# Patient Record
Sex: MALE | Race: WHITE | NOT HISPANIC OR LATINO
[De-identification: names, ages, dates, MRNs, and addresses within clinical notes are randomized per-mention and may not be internally consistent; named-entity substitution may affect disease eponyms.]

---

## 2020-07-08 ENCOUNTER — TRANSCRIPTION ENCOUNTER (OUTPATIENT)
Age: 40
End: 2020-07-08

## 2020-07-08 ENCOUNTER — INPATIENT (INPATIENT)
Facility: HOSPITAL | Age: 40
LOS: 1 days | Discharge: HOME | DRG: 518 | End: 2020-07-10
Attending: ORTHOPAEDIC SURGERY | Admitting: ORTHOPAEDIC SURGERY
Payer: MEDICAID

## 2020-07-08 VITALS
TEMPERATURE: 98 F | OXYGEN SATURATION: 95 % | RESPIRATION RATE: 18 BRPM | HEART RATE: 74 BPM | DIASTOLIC BLOOD PRESSURE: 77 MMHG | SYSTOLIC BLOOD PRESSURE: 121 MMHG | WEIGHT: 210.1 LBS

## 2020-07-08 DIAGNOSIS — M54.2 CERVICALGIA: ICD-10-CM

## 2020-07-08 DIAGNOSIS — Z98.890 OTHER SPECIFIED POSTPROCEDURAL STATES: Chronic | ICD-10-CM

## 2020-07-08 LAB
ALBUMIN SERPL ELPH-MCNC: 4.1 G/DL — SIGNIFICANT CHANGE UP (ref 3.3–5)
ALP SERPL-CCNC: 99 U/L — SIGNIFICANT CHANGE UP (ref 40–120)
ALT FLD-CCNC: 33 U/L — SIGNIFICANT CHANGE UP (ref 12–78)
ANION GAP SERPL CALC-SCNC: 7 MMOL/L — SIGNIFICANT CHANGE UP (ref 5–17)
APTT BLD: 35.6 SEC — HIGH (ref 27.5–35.5)
AST SERPL-CCNC: 31 U/L — SIGNIFICANT CHANGE UP (ref 15–37)
BASOPHILS # BLD AUTO: 0.02 K/UL — SIGNIFICANT CHANGE UP (ref 0–0.2)
BASOPHILS NFR BLD AUTO: 0.6 % — SIGNIFICANT CHANGE UP (ref 0–2)
BILIRUB SERPL-MCNC: 0.3 MG/DL — SIGNIFICANT CHANGE UP (ref 0.2–1.2)
BUN SERPL-MCNC: 16 MG/DL — SIGNIFICANT CHANGE UP (ref 7–23)
CALCIUM SERPL-MCNC: 8.7 MG/DL — SIGNIFICANT CHANGE UP (ref 8.5–10.1)
CHLORIDE SERPL-SCNC: 107 MMOL/L — SIGNIFICANT CHANGE UP (ref 96–108)
CO2 SERPL-SCNC: 26 MMOL/L — SIGNIFICANT CHANGE UP (ref 22–31)
CREAT SERPL-MCNC: 0.87 MG/DL — SIGNIFICANT CHANGE UP (ref 0.5–1.3)
EOSINOPHIL # BLD AUTO: 0.06 K/UL — SIGNIFICANT CHANGE UP (ref 0–0.5)
EOSINOPHIL NFR BLD AUTO: 1.8 % — SIGNIFICANT CHANGE UP (ref 0–6)
GLUCOSE SERPL-MCNC: 119 MG/DL — HIGH (ref 70–99)
HCT VFR BLD CALC: 38.7 % — LOW (ref 39–50)
HGB BLD-MCNC: 12.9 G/DL — LOW (ref 13–17)
IMM GRANULOCYTES NFR BLD AUTO: 0.3 % — SIGNIFICANT CHANGE UP (ref 0–1.5)
INR BLD: 1.05 RATIO — SIGNIFICANT CHANGE UP (ref 0.88–1.16)
LYMPHOCYTES # BLD AUTO: 1.36 K/UL — SIGNIFICANT CHANGE UP (ref 1–3.3)
LYMPHOCYTES # BLD AUTO: 40.6 % — SIGNIFICANT CHANGE UP (ref 13–44)
MCHC RBC-ENTMCNC: 26.2 PG — LOW (ref 27–34)
MCHC RBC-ENTMCNC: 33.3 GM/DL — SIGNIFICANT CHANGE UP (ref 32–36)
MCV RBC AUTO: 78.7 FL — LOW (ref 80–100)
MONOCYTES # BLD AUTO: 0.31 K/UL — SIGNIFICANT CHANGE UP (ref 0–0.9)
MONOCYTES NFR BLD AUTO: 9.3 % — SIGNIFICANT CHANGE UP (ref 2–14)
NEUTROPHILS # BLD AUTO: 1.59 K/UL — LOW (ref 1.8–7.4)
NEUTROPHILS NFR BLD AUTO: 47.4 % — SIGNIFICANT CHANGE UP (ref 43–77)
NRBC # BLD: 0 /100 WBCS — SIGNIFICANT CHANGE UP (ref 0–0)
PLATELET # BLD AUTO: 154 K/UL — SIGNIFICANT CHANGE UP (ref 150–400)
POTASSIUM SERPL-MCNC: 4 MMOL/L — SIGNIFICANT CHANGE UP (ref 3.5–5.3)
POTASSIUM SERPL-SCNC: 4 MMOL/L — SIGNIFICANT CHANGE UP (ref 3.5–5.3)
PROT SERPL-MCNC: 8 G/DL — SIGNIFICANT CHANGE UP (ref 6–8.3)
PROTHROM AB SERPL-ACNC: 12.3 SEC — SIGNIFICANT CHANGE UP (ref 10.6–13.6)
RBC # BLD: 4.92 M/UL — SIGNIFICANT CHANGE UP (ref 4.2–5.8)
RBC # FLD: 13.2 % — SIGNIFICANT CHANGE UP (ref 10.3–14.5)
SARS-COV-2 RNA SPEC QL NAA+PROBE: SIGNIFICANT CHANGE UP
SODIUM SERPL-SCNC: 140 MMOL/L — SIGNIFICANT CHANGE UP (ref 135–145)
WBC # BLD: 3.35 K/UL — LOW (ref 3.8–10.5)
WBC # FLD AUTO: 3.35 K/UL — LOW (ref 3.8–10.5)

## 2020-07-08 PROCEDURE — 72040 X-RAY EXAM NECK SPINE 2-3 VW: CPT | Mod: 26

## 2020-07-08 PROCEDURE — 71045 X-RAY EXAM CHEST 1 VIEW: CPT | Mod: 26

## 2020-07-08 PROCEDURE — 93010 ELECTROCARDIOGRAM REPORT: CPT

## 2020-07-08 PROCEDURE — 99285 EMERGENCY DEPT VISIT HI MDM: CPT

## 2020-07-08 RX ORDER — SODIUM CHLORIDE 9 MG/ML
1000 INJECTION, SOLUTION INTRAVENOUS
Refills: 0 | Status: DISCONTINUED | OUTPATIENT
Start: 2020-07-08 | End: 2020-07-09

## 2020-07-08 RX ORDER — LANOLIN ALCOHOL/MO/W.PET/CERES
3 CREAM (GRAM) TOPICAL AT BEDTIME
Refills: 0 | Status: DISCONTINUED | OUTPATIENT
Start: 2020-07-08 | End: 2020-07-09

## 2020-07-08 RX ADMIN — Medication 3 MILLIGRAM(S): at 23:30

## 2020-07-08 RX ADMIN — SODIUM CHLORIDE 100 MILLILITER(S): 9 INJECTION, SOLUTION INTRAVENOUS at 23:30

## 2020-07-08 NOTE — ED PROVIDER NOTE - CLINICAL SUMMARY MEDICAL DECISION MAKING FREE TEXT BOX
neck pain x 2 years, sent to ED by orthopedic Dr Vidales for surgery, will obtain labs, imaging, ortho consult , admission

## 2020-07-08 NOTE — CONSULT NOTE ADULT - ASSESSMENT
39M who presents for C5-6 total disc replacement tomorrow 7/9    -Pain control as needed  -WBAT/OOB with assistive devices as needed  -DVT ppx: SCDs, no chemical DVT ppx  -FU preop labs/workup  -Plan for total disc replacement with Dr. Vidales tomorrow 7/9  -FU medical clearance/optimization  -Medical management per primary team  -Will discuss with attending and will advise if plan changes 39M who presents for C5-6 total disc replacement tomorrow 7/9    -Pain control as needed  -WBAT/OOB with assistive devices as needed  -FU preop labs/workup  -Plan for total disc replacement with Dr. Vidales tomorrow 7/9  -NPO after midnight except medications  -IVF while NPO  -DVT ppx: SCDs, no chemical DVT ppx  -FU medical clearance/optimization  -Medical management per primary team  -Will discuss with attending and will advise if plan changes

## 2020-07-08 NOTE — CONSULT NOTE ADULT - ASSESSMENT
39y Male presents for spine surgery tomorrow 7/9. Pt is scheduled for total disc replacement with Dr. Vidales.     Plan: 39y Male presents for spine surgery tomorrow 7/9. Pt is scheduled for total disc replacement with Dr. Vidales.     Plan:    Patient is admitted for C5-6 total disc replacement tomorrow 7/9    -EKG 1st degree AV block, 62 bpm  - CXR reviewed  -CBC, CMP reviewed, f/u coags, type and screen  -Pain control as needed  -WBAT/OOB with assistive devices as needed  -NPO after midnight except medications  -IVF while NPO  -DVT ppx: SCDs, no chemical DVT ppx  - Patient is medically optimized at low risk risk for intermediate risk procedure with routine HD monitoring. 39y Male presents for spine surgery tomorrow 7/9. Pt is scheduled for total disc replacement with Dr. Vidales.     Plan:    Patient is admitted for C5-6 total disc replacement tomorrow 7/9    -EKG 1st degree AV block, 62 bpm  - CXR reviewed  -CBC, CMP reviewed, f/u coags, type and screen  -Pain control as needed  -WBAT/OOB with assistive devices as needed  -NPO after midnight except medications  -IVF while NPO  -DVT ppx: SCDs, no chemical DVT ppx  - No medical contraindications to proposed surgery
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
## 2020-07-08 NOTE — ED ADULT NURSE NOTE - NSIMPLEMENTINTERV_GEN_ALL_ED
Implemented All Universal Safety Interventions:  [place] to call system. Call bell, personal items and telephone within reach. Instruct patient to call for assistance. Room bathroom lighting operational. Non-slip footwear when patient is off stretcher. Physically safe environment: no spills, clutter or unnecessary equipment. Stretcher in lowest position, wheels locked, appropriate side rails in place.
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
## 2020-07-08 NOTE — ED PROVIDER NOTE - PROGRESS NOTE DETAILS
spoke with ortho resident, case discussed, requested xray cervical spine, will see patient spoke with ortho resident , admit to Dr Vidales, requested call hospitalist for medical clearance, surgery scheduled for tomorrow spoke with Dr Moe Dhillon, hospitalist, advised call Dr Miguel Dhillon spoke with Dr Miguel Dhillon, advised call Dr Daryl Perlman, call out to Dr Perlman, awaiting call back spoke with Dr Perlman, case discussed, will do medical clearance, advised call medicine resident  spoke with medicine resident, case discussed, will give to next medicine resident
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
## 2020-07-08 NOTE — H&P ADULT - ASSESSMENT
39M who presents for C5-6 total disc replacement tomorrow 7/9    -Pain control as needed  -WBAT/OOB with assistive devices as needed  -FU preop labs/workup  -Plan for total disc replacement with Dr. Vidales tomorrow 7/9  -NPO after midnight except medications  -IVF while NPO  -DVT ppx: SCDs, no chemical DVT ppx  -FU medical clearance/optimization  -Medical management per primary team  -Will discuss with attending and will advise if plan changes

## 2020-07-08 NOTE — ED ADULT NURSE NOTE - OBJECTIVE STATEMENT
Pt presents to the ED s/p left sided neck pain, occasionally causes numbness to the bilat elbows and right thumb.

## 2020-07-08 NOTE — ED PROVIDER NOTE - CHPI ED SYMPTOMS NEG
no tingling/no bruising/no numbness/no difficulty bearing weight/no fever/no back pain/no weakness/no deformity/no stiffness/no abrasion
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
## 2020-07-08 NOTE — ED PROVIDER NOTE - CPE EDP NEURO NORM
Pt complains of numbness/tingling on the left side of face. Rhinorrhea present. Post nasal drip. Complains of headache and back pain. Face symmetrical. No slurred speech. No nuchal rigidity, normal...

## 2020-07-09 ENCOUNTER — TRANSCRIPTION ENCOUNTER (OUTPATIENT)
Age: 40
End: 2020-07-09

## 2020-07-09 ENCOUNTER — RESULT REVIEW (OUTPATIENT)
Age: 40
End: 2020-07-09

## 2020-07-09 LAB
ANION GAP SERPL CALC-SCNC: 1 MMOL/L — LOW (ref 5–17)
ANION GAP SERPL CALC-SCNC: 5 MMOL/L — SIGNIFICANT CHANGE UP (ref 5–17)
APTT BLD: 33 SEC — SIGNIFICANT CHANGE UP (ref 27.5–35.5)
BASOPHILS # BLD AUTO: 0.01 K/UL — SIGNIFICANT CHANGE UP (ref 0–0.2)
BASOPHILS NFR BLD AUTO: 0.2 % — SIGNIFICANT CHANGE UP (ref 0–2)
BUN SERPL-MCNC: 13 MG/DL — SIGNIFICANT CHANGE UP (ref 7–23)
BUN SERPL-MCNC: 14 MG/DL — SIGNIFICANT CHANGE UP (ref 7–23)
CALCIUM SERPL-MCNC: 8.9 MG/DL — SIGNIFICANT CHANGE UP (ref 8.5–10.1)
CALCIUM SERPL-MCNC: 9 MG/DL — SIGNIFICANT CHANGE UP (ref 8.5–10.1)
CHLORIDE SERPL-SCNC: 107 MMOL/L — SIGNIFICANT CHANGE UP (ref 96–108)
CHLORIDE SERPL-SCNC: 107 MMOL/L — SIGNIFICANT CHANGE UP (ref 96–108)
CO2 SERPL-SCNC: 27 MMOL/L — SIGNIFICANT CHANGE UP (ref 22–31)
CO2 SERPL-SCNC: 33 MMOL/L — HIGH (ref 22–31)
CREAT SERPL-MCNC: 0.81 MG/DL — SIGNIFICANT CHANGE UP (ref 0.5–1.3)
CREAT SERPL-MCNC: 0.84 MG/DL — SIGNIFICANT CHANGE UP (ref 0.5–1.3)
EOSINOPHIL # BLD AUTO: 0.01 K/UL — SIGNIFICANT CHANGE UP (ref 0–0.5)
EOSINOPHIL NFR BLD AUTO: 0.2 % — SIGNIFICANT CHANGE UP (ref 0–6)
GLUCOSE SERPL-MCNC: 116 MG/DL — HIGH (ref 70–99)
GLUCOSE SERPL-MCNC: 79 MG/DL — SIGNIFICANT CHANGE UP (ref 70–99)
HCT VFR BLD CALC: 37.8 % — LOW (ref 39–50)
HCT VFR BLD CALC: 38.6 % — LOW (ref 39–50)
HGB BLD-MCNC: 12.6 G/DL — LOW (ref 13–17)
HGB BLD-MCNC: 12.7 G/DL — LOW (ref 13–17)
HIV 1+2 AB+HIV1 P24 AG SERPL QL IA: SIGNIFICANT CHANGE UP
IMM GRANULOCYTES NFR BLD AUTO: 0.4 % — SIGNIFICANT CHANGE UP (ref 0–1.5)
INR BLD: 1.1 RATIO — SIGNIFICANT CHANGE UP (ref 0.88–1.16)
LYMPHOCYTES # BLD AUTO: 0.62 K/UL — LOW (ref 1–3.3)
LYMPHOCYTES # BLD AUTO: 11.6 % — LOW (ref 13–44)
MCHC RBC-ENTMCNC: 26.1 PG — LOW (ref 27–34)
MCHC RBC-ENTMCNC: 26.3 PG — LOW (ref 27–34)
MCHC RBC-ENTMCNC: 32.9 GM/DL — SIGNIFICANT CHANGE UP (ref 32–36)
MCHC RBC-ENTMCNC: 33.3 GM/DL — SIGNIFICANT CHANGE UP (ref 32–36)
MCV RBC AUTO: 78.9 FL — LOW (ref 80–100)
MCV RBC AUTO: 79.4 FL — LOW (ref 80–100)
MONOCYTES # BLD AUTO: 0.09 K/UL — SIGNIFICANT CHANGE UP (ref 0–0.9)
MONOCYTES NFR BLD AUTO: 1.7 % — LOW (ref 2–14)
NEUTROPHILS # BLD AUTO: 4.59 K/UL — SIGNIFICANT CHANGE UP (ref 1.8–7.4)
NEUTROPHILS NFR BLD AUTO: 85.9 % — HIGH (ref 43–77)
NRBC # BLD: 0 /100 WBCS — SIGNIFICANT CHANGE UP (ref 0–0)
NRBC # BLD: 0 /100 WBCS — SIGNIFICANT CHANGE UP (ref 0–0)
PLATELET # BLD AUTO: 125 K/UL — LOW (ref 150–400)
PLATELET # BLD AUTO: 134 K/UL — LOW (ref 150–400)
POTASSIUM SERPL-MCNC: 3.5 MMOL/L — SIGNIFICANT CHANGE UP (ref 3.5–5.3)
POTASSIUM SERPL-MCNC: 4 MMOL/L — SIGNIFICANT CHANGE UP (ref 3.5–5.3)
POTASSIUM SERPL-SCNC: 3.5 MMOL/L — SIGNIFICANT CHANGE UP (ref 3.5–5.3)
POTASSIUM SERPL-SCNC: 4 MMOL/L — SIGNIFICANT CHANGE UP (ref 3.5–5.3)
PROTHROM AB SERPL-ACNC: 12.8 SEC — SIGNIFICANT CHANGE UP (ref 10.6–13.6)
RBC # BLD: 4.79 M/UL — SIGNIFICANT CHANGE UP (ref 4.2–5.8)
RBC # BLD: 4.86 M/UL — SIGNIFICANT CHANGE UP (ref 4.2–5.8)
RBC # FLD: 13.3 % — SIGNIFICANT CHANGE UP (ref 10.3–14.5)
RBC # FLD: 13.4 % — SIGNIFICANT CHANGE UP (ref 10.3–14.5)
SARS-COV-2 IGG SERPL QL IA: NEGATIVE — SIGNIFICANT CHANGE UP
SARS-COV-2 IGM SERPL IA-ACNC: <3.8 AU/ML — SIGNIFICANT CHANGE UP
SODIUM SERPL-SCNC: 139 MMOL/L — SIGNIFICANT CHANGE UP (ref 135–145)
SODIUM SERPL-SCNC: 141 MMOL/L — SIGNIFICANT CHANGE UP (ref 135–145)
WBC # BLD: 3.73 K/UL — LOW (ref 3.8–10.5)
WBC # BLD: 5.34 K/UL — SIGNIFICANT CHANGE UP (ref 3.8–10.5)
WBC # FLD AUTO: 3.73 K/UL — LOW (ref 3.8–10.5)
WBC # FLD AUTO: 5.34 K/UL — SIGNIFICANT CHANGE UP (ref 3.8–10.5)

## 2020-07-09 PROCEDURE — 88304 TISSUE EXAM BY PATHOLOGIST: CPT | Mod: 26

## 2020-07-09 RX ORDER — ASCORBIC ACID 60 MG
500 TABLET,CHEWABLE ORAL
Refills: 0 | Status: DISCONTINUED | OUTPATIENT
Start: 2020-07-09 | End: 2020-07-10

## 2020-07-09 RX ORDER — HYDROMORPHONE HYDROCHLORIDE 2 MG/ML
1 INJECTION INTRAMUSCULAR; INTRAVENOUS; SUBCUTANEOUS
Refills: 0 | Status: DISCONTINUED | OUTPATIENT
Start: 2020-07-09 | End: 2020-07-10

## 2020-07-09 RX ORDER — HYDROMORPHONE HYDROCHLORIDE 2 MG/ML
1 INJECTION INTRAMUSCULAR; INTRAVENOUS; SUBCUTANEOUS EVERY 8 HOURS
Refills: 0 | Status: DISCONTINUED | OUTPATIENT
Start: 2020-07-09 | End: 2020-07-10

## 2020-07-09 RX ORDER — SENNA PLUS 8.6 MG/1
2 TABLET ORAL AT BEDTIME
Refills: 0 | Status: DISCONTINUED | OUTPATIENT
Start: 2020-07-09 | End: 2020-07-10

## 2020-07-09 RX ORDER — BENZOCAINE AND MENTHOL 5; 1 G/100ML; G/100ML
1 LIQUID ORAL THREE TIMES A DAY
Refills: 0 | Status: DISCONTINUED | OUTPATIENT
Start: 2020-07-09 | End: 2020-07-10

## 2020-07-09 RX ORDER — CEFAZOLIN SODIUM 1 G
2000 VIAL (EA) INJECTION EVERY 8 HOURS
Refills: 0 | Status: COMPLETED | OUTPATIENT
Start: 2020-07-09 | End: 2020-07-10

## 2020-07-09 RX ORDER — CYCLOBENZAPRINE HYDROCHLORIDE 10 MG/1
10 TABLET, FILM COATED ORAL EVERY 8 HOURS
Refills: 0 | Status: DISCONTINUED | OUTPATIENT
Start: 2020-07-09 | End: 2020-07-10

## 2020-07-09 RX ORDER — CEPHALEXIN 500 MG
1 CAPSULE ORAL
Qty: 3 | Refills: 0
Start: 2020-07-09 | End: 2020-07-09

## 2020-07-09 RX ORDER — HYDROMORPHONE HYDROCHLORIDE 2 MG/ML
0.5 INJECTION INTRAMUSCULAR; INTRAVENOUS; SUBCUTANEOUS
Refills: 0 | Status: DISCONTINUED | OUTPATIENT
Start: 2020-07-09 | End: 2020-07-10

## 2020-07-09 RX ORDER — SODIUM CHLORIDE 9 MG/ML
1000 INJECTION, SOLUTION INTRAVENOUS
Refills: 0 | Status: DISCONTINUED | OUTPATIENT
Start: 2020-07-09 | End: 2020-07-10

## 2020-07-09 RX ORDER — FOLIC ACID 0.8 MG
1 TABLET ORAL DAILY
Refills: 0 | Status: DISCONTINUED | OUTPATIENT
Start: 2020-07-09 | End: 2020-07-10

## 2020-07-09 RX ORDER — CEFAZOLIN SODIUM 1 G
2000 VIAL (EA) INJECTION ONCE
Refills: 0 | Status: DISCONTINUED | OUTPATIENT
Start: 2020-07-09 | End: 2020-07-09

## 2020-07-09 RX ORDER — METOCLOPRAMIDE HCL 10 MG
10 TABLET ORAL ONCE
Refills: 0 | Status: DISCONTINUED | OUTPATIENT
Start: 2020-07-09 | End: 2020-07-10

## 2020-07-09 RX ORDER — ACETAMINOPHEN 500 MG
650 TABLET ORAL EVERY 6 HOURS
Refills: 0 | Status: DISCONTINUED | OUTPATIENT
Start: 2020-07-09 | End: 2020-07-10

## 2020-07-09 RX ORDER — ONDANSETRON 8 MG/1
4 TABLET, FILM COATED ORAL EVERY 6 HOURS
Refills: 0 | Status: DISCONTINUED | OUTPATIENT
Start: 2020-07-09 | End: 2020-07-10

## 2020-07-09 RX ORDER — CYCLOBENZAPRINE HYDROCHLORIDE 10 MG/1
1 TABLET, FILM COATED ORAL
Qty: 21 | Refills: 0
Start: 2020-07-09 | End: 2020-07-15

## 2020-07-09 RX ADMIN — Medication 500 MILLIGRAM(S): at 19:07

## 2020-07-09 RX ADMIN — SODIUM CHLORIDE 120 MILLILITER(S): 9 INJECTION, SOLUTION INTRAVENOUS at 13:58

## 2020-07-09 RX ADMIN — SENNA PLUS 2 TABLET(S): 8.6 TABLET ORAL at 21:55

## 2020-07-09 RX ADMIN — Medication 100 MILLIGRAM(S): at 21:55

## 2020-07-09 RX ADMIN — HYDROMORPHONE HYDROCHLORIDE 1 MILLIGRAM(S): 2 INJECTION INTRAMUSCULAR; INTRAVENOUS; SUBCUTANEOUS at 19:08

## 2020-07-09 RX ADMIN — BENZOCAINE AND MENTHOL 1 LOZENGE: 5; 1 LIQUID ORAL at 22:33

## 2020-07-09 RX ADMIN — CYCLOBENZAPRINE HYDROCHLORIDE 10 MILLIGRAM(S): 10 TABLET, FILM COATED ORAL at 21:55

## 2020-07-09 RX ADMIN — Medication 1 TABLET(S): at 19:07

## 2020-07-09 NOTE — PROGRESS NOTE ADULT - ASSESSMENT
Assessment/Plan:   39y Male with cervical radiculopathy    -Plan for total disc replacement C5-6 today with Dr. Vidales  -No changes in exam since ER presentation  -Pain control as needed  -WBAT/OOB with assistive devices as needed  -DVT ppx: SCDs, no chemical DVT ppx  -NPO except medications  -IVF while NPO  -Will discuss with attending and will advise if plan changes

## 2020-07-09 NOTE — DISCHARGE NOTE PROVIDER - NSDCFUADDAPPT_GEN_ALL_CORE_FT
1. Walk plenty  2. No lifting over 5 lbs  3. See your surgeon in about 10 days in the office. Call to schedule.  4. Keep bandage on, clean and dry. Change to a new one if gets wet or dirty. Ok to shower from waist down.   5. Pain meds: eRx sent to your pharmacy electronically, you need to pick it up  6. No driving on pain meds
Home

## 2020-07-09 NOTE — PROGRESS NOTE ADULT - ASSESSMENT
39y Male presents for spine surgery tomorrow 7/9. Pt is scheduled for total disc replacement with Dr. Vidales.     Plan:    Patient is admitted for C5-6 total disc replacement 7/9    -EKG 1st degree AV block, 62 bpm  - CXR reviewed  -CBC, CMP reviewed, f/u coags, type and screen  -Pain control as needed  -WBAT/OOB with assistive devices as needed  -NPO after midnight except medications  -IVF while NPO  -DVT ppx: SCDs, no chemical DVT ppx  - No medical contraindications to proposed surgery

## 2020-07-09 NOTE — DISCHARGE NOTE PROVIDER - CARE PROVIDER_API CALL
Jayy Vidales  ORTHOPAEDIC SURGERY  [id number]  [place] [address]  Phone: [phone]  Fax: [phone]  Follow Up Time: 2 weeks

## 2020-07-09 NOTE — DISCHARGE NOTE PROVIDER - HOSPITAL COURSE
Orthopaedic Summary        Patient presented to [facility] after being medically cleared for the ACDF (7/9/20) having failed outpatient non-operative conservative management. Prophylactic antibiotics were started before the procedure and continued until the drain was removed. There were no complications during the procedure and patient tolerated the procedure well.  The patient was transferred to recovery room in stable condition and subsequently to the orthopedic floor. Routine consults were obtained from Physical Therapy and from the Hospitalist for Medical Co-management. The patient was given SCD's for DVT prophylaxis. Pertinent home medications were continued. The patient received physical therapy daily and daily labs were followed. POD 0 there were no overnight events. POD1 pt was transitioned to PO pain medication and pt was up OOB ambulating. The rest of the hospital stay was unremarkable. The patient was discharged in stable condition to follow up as outpatient. The orthopaedic attending is aware and agrees. Orthopaedic Summary        Patient presented to [facility] after being medically cleared for the s/p C5-6 total disc replacement(7/9/20) having failed outpatient non-operative conservative management. Prophylactic antibiotics were started before the procedure and continued for 24 hrs. There were no complications during the procedure and patient tolerated the procedure well.  The patient was transferred to recovery room in stable condition and subsequently to the orthopedic floor. Routine consults were obtained from Physical Therapy and from the Hospitalist for Medical Co-management. The patient was given SCD's for DVT prophylaxis. Pertinent home medications were continued. The patient received physical therapy daily and daily labs were followed. POD 0 there were no overnight events. POD1 pt was transitioned to PO pain medication and pt was up OOB ambulating. The rest of the hospital stay was unremarkable. The patient was discharged in stable condition to follow up as outpatient. The orthopaedic attending is aware and agrees. 01-Mar-2020 06:20

## 2020-07-09 NOTE — PROGRESS NOTE ADULT - SUBJECTIVE AND OBJECTIVE BOX
Patient seen and examined at bedside, resting comfortably. Pain well controlled. Denies headache/dizziness/lightheadedness, chest pain, dyspnea, n/v, numbness/tingling. No complaints at this time. No overnight events.                          12.7   3.73  )-----------( 134      ( 09 Jul 2020 05:21 )             38.6     07-09    141  |  107  |  14  ----------------------------<  79  4.0   |  33<H>  |  0.81    Ca    9.0      09 Jul 2020 05:21    TPro  8.0  /  Alb  4.1  /  TBili  0.3  /  DBili  x   /  AST  31  /  ALT  33  /  AlkPhos  99  07-08    PT/INR - ( 09 Jul 2020 05:21 )   PT: 12.8 sec;   INR: 1.10 ratio         PTT - ( 09 Jul 2020 05:21 )  PTT:33.0 sec      Vital Signs Last 24 Hrs  T(C): 36.4 (09 Jul 2020 05:00), Max: 36.9 (08 Jul 2020 12:53)  T(F): 97.5 (09 Jul 2020 05:00), Max: 98.5 (08 Jul 2020 12:53)  HR: 46 (09 Jul 2020 05:00) (46 - 74)  BP: 118/75 (09 Jul 2020 05:00) (104/61 - 121/77)  BP(mean): --  RR: 16 (09 Jul 2020 05:00) (16 - 19)  SpO2: 97% (09 Jul 2020 05:00) (95% - 97%)    General: NAD, Awake and Alert    Spine:  PHYSICAL EXAM  GEN: NAD, AAOx3    SPINE:  Skin intact  Grossly moving all extremities  + Median/Radial/Ulnar/Musculocutaneous/Axillary nerves intact  + Hip Flexors/Quads/TA/EHL/FHL/GS  SILT C5-T1  SILT L2-S1  + Radial Pulse  + DP Pulses  Compartments soft and compressible  No calf tenderness bilaterally      Motor:                          Deltoid       Biceps      Triceps      Wrist Ext      Finger Flex    Finger Abduction   RIGHT             5/5             5/5             5/5             5/5                 5/5                     5/5  LEFT                5/5             5/5             5/5             5/5                 5/5                     5/5                          Hip Flex       Knee Ext        Knee Flex     Dorsiflex      Hallux Ext        PlantarFlex  RIGHT            5/5                5/5                 5/5               5/5                 5/5                    5/5  LEFT               5/5                5/5                 5/5               5/5                 5/5                    5/5      Sensory:                      C5      C6      C7      C8       T1          RIGHT          2         2        1         1         2          (0=absent, 1=impaired, 2=normal, NT=not testable)  LEFT             2         2        2         2         2          (0=absent, 1=impaired, 2=normal, NT=not testable)                        L2        L3       L4      L5       S1          RIGHT        2          2         2        2        2           (0=absent, 1=impaired, 2=normal, NT=not testable)  LEFT           2          2         2        2        2           (0=absent, 1=impaired, 2=normal, NT=not testable)

## 2020-07-09 NOTE — DISCHARGE NOTE PROVIDER - NSDCCPCAREPLAN_GEN_ALL_CORE_FT
PRINCIPAL DISCHARGE DIAGNOSIS  Diagnosis: Neck pain, musculoskeletal  Assessment and Plan of Treatment:

## 2020-07-09 NOTE — DISCHARGE NOTE PROVIDER - NSDCMRMEDTOKEN_GEN_ALL_CORE_FT
cyclobenzaprine 10 mg oral tablet: 1 tab(s) orally every 8 hours   Keflex 500 mg oral capsule: 1 cap(s) orally 3 times a day   oxycodone-acetaminophen 5 mg-325 mg oral tablet: 1 tab(s) orally every 4 hours MDD:6  testosterone 1% transdermal gel: Apply 2 packet(s) every morning.

## 2020-07-09 NOTE — PROGRESS NOTE ADULT - SUBJECTIVE AND OBJECTIVE BOX
Patient is a 39y old  Male who presents with a chief complaint of Spine surgery tomorrow 7/9 (09 Jul 2020 06:28)  feels well at rest presently      INTERVAL HPI/OVERNIGHT EVENTS:  T(C): 36.4 (07-09-20 @ 05:00), Max: 36.9 (07-08-20 @ 12:53)  HR: 46 (07-09-20 @ 05:00) (46 - 74)  BP: 118/75 (07-09-20 @ 05:00) (104/61 - 121/77)  RR: 16 (07-09-20 @ 05:00) (16 - 19)  SpO2: 97% (07-09-20 @ 05:00) (95% - 97%)  Wt(kg): --  I&O's Summary    08 Jul 2020 07:01  -  09 Jul 2020 07:00  --------------------------------------------------------  IN: 800 mL / OUT: 0 mL / NET: 800 mL        LABS:                        12.7   3.73  )-----------( 134      ( 09 Jul 2020 05:21 )             38.6     07-09    141  |  107  |  14  ----------------------------<  79  4.0   |  33<H>  |  0.81    Ca    9.0      09 Jul 2020 05:21    TPro  8.0  /  Alb  4.1  /  TBili  0.3  /  DBili  x   /  AST  31  /  ALT  33  /  AlkPhos  99  07-08    PT/INR - ( 09 Jul 2020 05:21 )   PT: 12.8 sec;   INR: 1.10 ratio         PTT - ( 09 Jul 2020 05:21 )  PTT:33.0 sec    CAPILLARY BLOOD GLUCOSE                MEDICATIONS  (STANDING):  lactated ringers. 1000 milliLiter(s) (100 mL/Hr) IV Continuous <Continuous>    MEDICATIONS  (PRN):  melatonin 3 milliGRAM(s) Oral at bedtime PRN Sleep      REVIEW OF SYSTEMS:  CONSTITUTIONAL: No fever, weight loss, or fatigue  EYES: No eye pain, visual disturbances, or discharge  ENMT:  No difficulty hearing, tinnitus, vertigo; No sinus or throat pain  NECK: No pain or stiffness  RESPIRATORY: No cough, wheezing, chills or hemoptysis; No shortness of breath  CARDIOVASCULAR: No chest pain, palpitations, dizziness, or leg swelling  GASTROINTESTINAL: No abdominal or epigastric pain. No nausea, vomiting, or hematemesis; No diarrhea or constipation. No melena or hematochezia.  GENITOURINARY: No dysuria, frequency, hematuria, or incontinence  NEUROLOGICAL: No headaches, memory loss, loss of strength, numbness, or tremors  SKIN: No itching, burning, rashes, or lesions   LYMPH NODES: No enlarged glands  ENDOCRINE: No heat or cold intolerance; No hair loss  MUSCULOSKELETAL: neck pain  PSYCHIATRIC: No depression, anxiety, mood swings, or difficulty sleeping  HEME/LYMPH: No easy bruising, or bleeding gums  ALLERY AND IMMUNOLOGIC: No hives or eczema    RADIOLOGY & ADDITIONAL TESTS:    Imaging Personally Reviewed:  [ ] YES  [ ] NO    Consultant(s) Notes Reviewed:  [ ] YES  [ ] NO    PHYSICAL EXAM:  GENERAL: NAD, well-groomed, well-developed  HEAD:  Atraumatic, Normocephalic  EYES: EOMI, PERRLA, conjunctiva and sclera clear  ENMT: No tonsillar erythema, exudates, or enlargement; Moist mucous membranes, Good dentition, No lesions  NECK: Supple, No JVD, Normal thyroid  NERVOUS SYSTEM:  Alert & Oriented X3, Good concentration; Motor Strength 5/5 B/L upper and lower extremities; DTRs 2+ intact and symmetric  CHEST/LUNG: Clear to percussion bilaterally; No rales, rhonchi, wheezing, or rubs  HEART: Regular rate and rhythm; No murmurs, rubs, or gallops  ABDOMEN: Soft, Nontender, Nondistended; Bowel sounds present  EXTREMITIES:  2+ Peripheral Pulses, No clubbing, cyanosis, or edema  LYMPH: No lymphadenopathy noted  SKIN: No rashes or lesions    Care Discussed with Consultants/Other Providers [ ] YES  [ ] NO

## 2020-07-10 ENCOUNTER — TRANSCRIPTION ENCOUNTER (OUTPATIENT)
Age: 40
End: 2020-07-10

## 2020-07-10 VITALS
DIASTOLIC BLOOD PRESSURE: 75 MMHG | SYSTOLIC BLOOD PRESSURE: 118 MMHG | OXYGEN SATURATION: 96 % | RESPIRATION RATE: 18 BRPM | HEART RATE: 72 BPM | TEMPERATURE: 98 F

## 2020-07-10 LAB
ANION GAP SERPL CALC-SCNC: 6 MMOL/L — SIGNIFICANT CHANGE UP (ref 5–17)
BASOPHILS # BLD AUTO: 0.01 K/UL — SIGNIFICANT CHANGE UP (ref 0–0.2)
BASOPHILS NFR BLD AUTO: 0.1 % — SIGNIFICANT CHANGE UP (ref 0–2)
BUN SERPL-MCNC: 10 MG/DL — SIGNIFICANT CHANGE UP (ref 7–23)
CALCIUM SERPL-MCNC: 9.8 MG/DL — SIGNIFICANT CHANGE UP (ref 8.5–10.1)
CHLORIDE SERPL-SCNC: 106 MMOL/L — SIGNIFICANT CHANGE UP (ref 96–108)
CO2 SERPL-SCNC: 28 MMOL/L — SIGNIFICANT CHANGE UP (ref 22–31)
CREAT SERPL-MCNC: 0.78 MG/DL — SIGNIFICANT CHANGE UP (ref 0.5–1.3)
EOSINOPHIL # BLD AUTO: 0 K/UL — SIGNIFICANT CHANGE UP (ref 0–0.5)
EOSINOPHIL NFR BLD AUTO: 0 % — SIGNIFICANT CHANGE UP (ref 0–6)
GLUCOSE SERPL-MCNC: 94 MG/DL — SIGNIFICANT CHANGE UP (ref 70–99)
HCT VFR BLD CALC: 39.1 % — SIGNIFICANT CHANGE UP (ref 39–50)
HGB BLD-MCNC: 13.1 G/DL — SIGNIFICANT CHANGE UP (ref 13–17)
IMM GRANULOCYTES NFR BLD AUTO: 0.4 % — SIGNIFICANT CHANGE UP (ref 0–1.5)
LYMPHOCYTES # BLD AUTO: 1.06 K/UL — SIGNIFICANT CHANGE UP (ref 1–3.3)
LYMPHOCYTES # BLD AUTO: 10.6 % — LOW (ref 13–44)
MCHC RBC-ENTMCNC: 26.4 PG — LOW (ref 27–34)
MCHC RBC-ENTMCNC: 33.5 GM/DL — SIGNIFICANT CHANGE UP (ref 32–36)
MCV RBC AUTO: 78.7 FL — LOW (ref 80–100)
MONOCYTES # BLD AUTO: 0.59 K/UL — SIGNIFICANT CHANGE UP (ref 0–0.9)
MONOCYTES NFR BLD AUTO: 5.9 % — SIGNIFICANT CHANGE UP (ref 2–14)
NEUTROPHILS # BLD AUTO: 8.29 K/UL — HIGH (ref 1.8–7.4)
NEUTROPHILS NFR BLD AUTO: 83 % — HIGH (ref 43–77)
NRBC # BLD: 0 /100 WBCS — SIGNIFICANT CHANGE UP (ref 0–0)
PLATELET # BLD AUTO: 149 K/UL — LOW (ref 150–400)
POTASSIUM SERPL-MCNC: 3.7 MMOL/L — SIGNIFICANT CHANGE UP (ref 3.5–5.3)
POTASSIUM SERPL-SCNC: 3.7 MMOL/L — SIGNIFICANT CHANGE UP (ref 3.5–5.3)
RBC # BLD: 4.97 M/UL — SIGNIFICANT CHANGE UP (ref 4.2–5.8)
RBC # FLD: 13.6 % — SIGNIFICANT CHANGE UP (ref 10.3–14.5)
SODIUM SERPL-SCNC: 140 MMOL/L — SIGNIFICANT CHANGE UP (ref 135–145)
WBC # BLD: 9.99 K/UL — SIGNIFICANT CHANGE UP (ref 3.8–10.5)
WBC # FLD AUTO: 9.99 K/UL — SIGNIFICANT CHANGE UP (ref 3.8–10.5)

## 2020-07-10 PROCEDURE — C1889: CPT

## 2020-07-10 PROCEDURE — 87635 SARS-COV-2 COVID-19 AMP PRB: CPT

## 2020-07-10 PROCEDURE — 88304 TISSUE EXAM BY PATHOLOGIST: CPT

## 2020-07-10 PROCEDURE — 85027 COMPLETE CBC AUTOMATED: CPT

## 2020-07-10 PROCEDURE — 76000 FLUOROSCOPY <1 HR PHYS/QHP: CPT

## 2020-07-10 PROCEDURE — 86900 BLOOD TYPING SEROLOGIC ABO: CPT

## 2020-07-10 PROCEDURE — 72040 X-RAY EXAM NECK SPINE 2-3 VW: CPT

## 2020-07-10 PROCEDURE — 93005 ELECTROCARDIOGRAM TRACING: CPT

## 2020-07-10 PROCEDURE — 86769 SARS-COV-2 COVID-19 ANTIBODY: CPT

## 2020-07-10 PROCEDURE — 71045 X-RAY EXAM CHEST 1 VIEW: CPT

## 2020-07-10 PROCEDURE — 87389 HIV-1 AG W/HIV-1&-2 AB AG IA: CPT

## 2020-07-10 PROCEDURE — 80053 COMPREHEN METABOLIC PANEL: CPT

## 2020-07-10 PROCEDURE — 80048 BASIC METABOLIC PNL TOTAL CA: CPT

## 2020-07-10 PROCEDURE — 99285 EMERGENCY DEPT VISIT HI MDM: CPT

## 2020-07-10 PROCEDURE — 85610 PROTHROMBIN TIME: CPT

## 2020-07-10 PROCEDURE — 85730 THROMBOPLASTIN TIME PARTIAL: CPT

## 2020-07-10 PROCEDURE — 36415 COLL VENOUS BLD VENIPUNCTURE: CPT

## 2020-07-10 PROCEDURE — 86901 BLOOD TYPING SEROLOGIC RH(D): CPT

## 2020-07-10 PROCEDURE — 86850 RBC ANTIBODY SCREEN: CPT

## 2020-07-10 PROCEDURE — C1713: CPT

## 2020-07-10 RX ORDER — HYDROMORPHONE HYDROCHLORIDE 2 MG/ML
1 INJECTION INTRAMUSCULAR; INTRAVENOUS; SUBCUTANEOUS EVERY 8 HOURS
Refills: 0 | Status: DISCONTINUED | OUTPATIENT
Start: 2020-07-10 | End: 2020-07-10

## 2020-07-10 RX ADMIN — Medication 100 MILLIGRAM(S): at 06:26

## 2020-07-10 RX ADMIN — BENZOCAINE AND MENTHOL 1 LOZENGE: 5; 1 LIQUID ORAL at 06:26

## 2020-07-10 RX ADMIN — CYCLOBENZAPRINE HYDROCHLORIDE 10 MILLIGRAM(S): 10 TABLET, FILM COATED ORAL at 06:26

## 2020-07-10 RX ADMIN — Medication 1 TABLET(S): at 06:26

## 2020-07-10 RX ADMIN — Medication 500 MILLIGRAM(S): at 06:26

## 2020-07-10 RX ADMIN — HYDROMORPHONE HYDROCHLORIDE 1 MILLIGRAM(S): 2 INJECTION INTRAMUSCULAR; INTRAVENOUS; SUBCUTANEOUS at 03:13

## 2020-07-10 NOTE — DISCHARGE NOTE NURSING/CASE MANAGEMENT/SOCIAL WORK - NSDCFUADDAPPT_GEN_ALL_CORE_FT
1. Walk plenty  2. No lifting over 5 lbs  3. See your surgeon in about 10 days in the office. Call to schedule.  4. Keep bandage on, clean and dry. Change to a new one if gets wet or dirty. Ok to shower from waist down.   5. Pain meds: eRx sent to your pharmacy electronically, you need to pick it up  6. No driving on pain meds

## 2020-07-10 NOTE — DISCHARGE NOTE NURSING/CASE MANAGEMENT/SOCIAL WORK - PATIENT PORTAL LINK FT
You can access the FollowMyHealth Patient Portal offered by [facility] by registering at the following website: [email or web address]. By joining [email or web address]’s FollowMyHealth portal, you will also be able to view your health information using other applications (apps) compatible with our system.

## 2020-07-10 NOTE — PROGRESS NOTE ADULT - SUBJECTIVE AND OBJECTIVE BOX
The patient was interviewed and evaluated  39y Male s/p c5-c6 total disc replacement and fusion pod 1    T(C): 36.7 (07-10-20 @ 09:19), Max: 36.9 (07-09-20 @ 13:32)  HR: 72 (07-10-20 @ 09:19) (50 - 82)  BP: 118/75 (07-10-20 @ 09:19) (111/61 - 130/75)  RR: 18 (07-10-20 @ 09:19) (10 - 18)  SpO2: 96% (07-10-20 @ 09:19) (94% - 98%)      Pt seen, doing well, no anesthesia complications or complaints noted or reported.   No Nausea    All questions answered    No additional recommendations.     Pain well controlled

## 2020-07-10 NOTE — PROGRESS NOTE ADULT - ASSESSMENT
Assessment/Plan:   39y Male with cervical radiculopathy s/p total disc replacement C5-6 POD 1    -Pain control as needed  -WBAT/OOB with assistive devices as needed  -DVT ppx: SCDs, no chemical DVT ppx  -Advance diet  -Plan for discharge today  -Will discuss with attending and will advise if plan changes

## 2020-07-10 NOTE — PROGRESS NOTE ADULT - SUBJECTIVE AND OBJECTIVE BOX
Patient seen and examined at bedside, resting comfortably. Pain well controlled. No dysphagia, no SOB, no UMN signs. Denies headache/dizziness/lightheadedness, chest pain, dyspnea, n/v, numbness/tingling. No complaints at this time. No overnight events.         Vital Signs Last 24 Hrs  T(C): 36.5 (10 Jul 2020 05:38), Max: 36.9 (09 Jul 2020 13:32)  T(F): 97.7 (10 Jul 2020 05:38), Max: 98.4 (09 Jul 2020 13:32)  HR: 50 (10 Jul 2020 05:38) (50 - 82)  BP: 120/74 (10 Jul 2020 05:38) (111/61 - 130/75)  BP(mean): 78 (09 Jul 2020 22:06) (78 - 78)  RR: 16 (10 Jul 2020 05:38) (10 - 18)  SpO2: 96% (10 Jul 2020 05:38) (94% - 98%)      LABS:                          12.6   5.34  )-----------( 125      ( 09 Jul 2020 14:52 )             37.8     07-09    139  |  107  |  13  ----------------------------<  116<H>  3.5   |  27  |  0.84    Ca    8.9      09 Jul 2020 14:52    TPro  8.0  /  Alb  4.1  /  TBili  0.3  /  DBili  x   /  AST  31  /  ALT  33  /  AlkPhos  99  07-08    PT/INR - ( 09 Jul 2020 05:21 )   PT: 12.8 sec;   INR: 1.10 ratio         PTT - ( 09 Jul 2020 05:21 )  PTT:33.0 sec                      General: NAD, Awake and Alert    Spine:  PHYSICAL EXAM  GEN: NAD, AAOx3    SPINE:  Skin intact  Grossly moving all extremities  + Median/Radial/Ulnar/Musculocutaneous/Axillary nerves intact  + Hip Flexors/Quads/TA/EHL/FHL/GS  SILT C5-T1  SILT L2-S1  + Radial Pulse  + DP Pulses  Compartments soft and compressible  No calf tenderness bilaterally      Motor:                          Deltoid       Biceps      Triceps      Wrist Ext      Finger Flex    Finger Abduction   RIGHT             5/5             5/5             5/5             5/5                 5/5                     5/5  LEFT                5/5             5/5             5/5             5/5                 5/5                     5/5                          Hip Flex       Knee Ext        Knee Flex     Dorsiflex      Hallux Ext        PlantarFlex  RIGHT            5/5                5/5                 5/5               5/5                 5/5                    5/5  LEFT               5/5                5/5                 5/5               5/5                 5/5                    5/5      Sensory:                      C5      C6      C7      C8       T1          RIGHT          2         2        2         2         2          (0=absent, 1=impaired, 2=normal, NT=not testable)  LEFT             2         2        2         2         2          (0=absent, 1=impaired, 2=normal, NT=not testable)                        L2        L3       L4      L5       S1          RIGHT        2          2         2        2        2           (0=absent, 1=impaired, 2=normal, NT=not testable)  LEFT           2          2         2        2        2           (0=absent, 1=impaired, 2=normal, NT=not testable)

## 2021-07-12 PROBLEM — Z78.9 OTHER SPECIFIED HEALTH STATUS: Chronic | Status: ACTIVE | Noted: 2020-07-08

## 2021-07-21 PROBLEM — Z00.00 ENCOUNTER FOR PREVENTIVE HEALTH EXAMINATION: Status: ACTIVE | Noted: 2021-07-21

## 2021-07-22 ENCOUNTER — APPOINTMENT (OUTPATIENT)
Dept: NEUROSURGERY | Facility: CLINIC | Age: 41
End: 2021-07-22
Payer: MEDICAID

## 2021-07-22 DIAGNOSIS — Z98.890 OTHER SPECIFIED POSTPROCEDURAL STATES: ICD-10-CM

## 2021-07-22 DIAGNOSIS — M54.2 CERVICALGIA: ICD-10-CM

## 2021-07-22 DIAGNOSIS — M54.9 DORSALGIA, UNSPECIFIED: ICD-10-CM

## 2021-07-22 PROCEDURE — 99214 OFFICE O/P EST MOD 30 MIN: CPT

## 2021-07-22 PROCEDURE — 99204 OFFICE O/P NEW MOD 45 MIN: CPT

## 2021-07-22 NOTE — DATA REVIEWED
[de-identified] : Report scanned into the chart of the cervical, thoracic, and lumbar spine from [facility] [de-identified] : Were reviewed of the cervical spine -7/22/2021: No acute fracture/dislocation.  Straightening of the cervical lordosis evidence of previous C5-6 disc replacement with some adjacent segment degeneration above at C4-5

## 2021-07-22 NOTE — PHYSICAL EXAM
[General Appearance - Alert] : alert [General Appearance - In No Acute Distress] : in no acute distress [General Appearance - Well Nourished] : well nourished [General Appearance - Well Developed] : well developed [General Appearance - Well-Appearing] : healthy appearing [] : normal voice and communication [Oriented To Time, Place, And Person] : oriented to person, place, and time [Impaired Insight] : insight and judgment were intact [Affect] : the affect was normal [Mood] : the mood was normal [Memory Recent] : recent memory was not impaired [Memory Remote] : remote memory was not impaired [Person] : oriented to person [Place] : oriented to place [Time] : oriented to time [Motor Tone] : muscle tone was normal in all four extremities [Involuntary Movements] : no involuntary movements were seen [No Muscle Atrophy] : normal bulk in all four extremities [5] : S1 ankle flexors 5/5 [Abnormal Walk] : normal gait [Straight-Leg Raise Test - Left] : straight leg raise of the left leg was negative [Straight-Leg Raise Test - Right] : straight leg raise  of the right leg was negative [Normal] : normal [FreeTextEntry1] : Evidence of previous surgery with well-healed anterior cervical scar

## 2021-07-22 NOTE — ASSESSMENT
[FreeTextEntry1] : Discussed the history, physical examination findings, and recent imaging with the patient with all questions answered.  Based on the recent findings and discussion with the patient, discussed that there is no role for acute neurosurgical intervention at this time.  Conservative treatment recommended.  Discussed that further evaluation is warranted with a neurology consultation for possible EMG/NCV study with the patient's complaints of bilateral elbow numbness down the arm.  The patient states that he feels that he needs an operation which we are not recommending at this time.  The patient is also been seen with the neurosurgery attending who agrees with the above recommendations.\par \par Discussed that no further evaluation is warranted from our office.  The patient has essentially been discharged from the practice.
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
## 2022-03-24 ENCOUNTER — APPOINTMENT (OUTPATIENT)
Dept: SPINE | Facility: CLINIC | Age: 42
End: 2022-03-24

## 2022-05-11 ENCOUNTER — APPOINTMENT (OUTPATIENT)
Dept: ORTHOPEDIC SURGERY | Facility: CLINIC | Age: 42
End: 2022-05-11

## 2022-05-11 ENCOUNTER — APPOINTMENT (OUTPATIENT)
Dept: ORTHOPEDIC SURGERY | Facility: CLINIC | Age: 42
End: 2022-05-11
Payer: OTHER MISCELLANEOUS

## 2022-05-11 VITALS — WEIGHT: 260 LBS | HEIGHT: 72 IN | BODY MASS INDEX: 35.21 KG/M2

## 2022-05-11 DIAGNOSIS — Z78.9 OTHER SPECIFIED HEALTH STATUS: ICD-10-CM

## 2022-05-11 DIAGNOSIS — Z98.890 OTHER SPECIFIED POSTPROCEDURAL STATES: ICD-10-CM

## 2022-05-11 PROCEDURE — 72100 X-RAY EXAM L-S SPINE 2/3 VWS: CPT

## 2022-05-11 PROCEDURE — 99204 OFFICE O/P NEW MOD 45 MIN: CPT

## 2022-05-11 PROCEDURE — 99072 ADDL SUPL MATRL&STAF TM PHE: CPT

## 2022-05-11 PROCEDURE — 72170 X-RAY EXAM OF PELVIS: CPT

## 2022-05-11 NOTE — PHYSICAL EXAM
[4___] : left extensor hallicus longus 4[unfilled]/5 [Left lower extremity above knee] : left lower extremity above knee [] : healed incision

## 2022-05-11 NOTE — WORK
[Consistent with my objective findings] : consistent with my objective findings [Total] : total [Does not reveal pre-existing condition(s) that may affect treatment/prognosis] : does not reveal pre-existing condition(s) that may affect treatment/prognosis [Cannot return to work because ________] : cannot return to work because [unfilled] [No Rx restrictions] : No Rx restrictions. [I actively supervised the healthcare provider named who provided these services] : I actively supervised the healthcare provider named who provided these services:

## 2022-05-11 NOTE — DISCUSSION/SUMMARY
[de-identified] : has pain in the side of the left thigh also in the front - had a left sided decomrpession at l3-4 and L4-5 - reviewed the MRi from 2020 - indicated for updated MRi L spine - may be looking at revission surgery versus possible recommendatoin for a stim \par has tried other tx other tx such at PT/jeovanny/MEDICATOINS - no change in disablity

## 2022-05-11 NOTE — HISTORY OF PRESENT ILLNESS
[Work related] : work related [Sudden] : sudden [8] : 8 [Constant] : constant [Household chores] : household chores [Leisure] : leisure [Work] : work [Sleep] : sleep [Social interactions] : social interactions [Injection therapy] : injection therapy [Not working due to injury] : Work status: not working due to injury [de-identified] : WC DOI 7/27/15 Low back\par \par 5/11/22:  40 yo m here for the evaluation of his low back; He reports he was working as a  and bent over the  steel cylinders and he heard popping sounds to his back. Had a lumbar discectomy in 2018. Had a left lumbar radiculopathy where there was medial leg shooting pain and numbness, but after the surgery, the pain and numbness shifted to the left lateral thigh. There is also left sided low back pain. No loss of b/b control. Wearing a back brace. right leg is okay.\par \par Treated by PT/SARMAD/Lumbar discectomy at L3-4, L4-L5 in 10/2018 - Dr Lerman ([facility])\par Has LESI post op still with significant back and leg pain\par Taking Ibuprofen\par Underwent a disc replacement at C5-6 in May 2020 (Dr. Vidales). \par \par Otherwise healthy - no baseline medications\par \par No surgeries\par \par No hx diabetes/cancer\par \par Unable to work as a \par \par xrays today\par L-spine 4v - L5-S1 DDD\par AP pelvis - No acute fracture\par \par MRI L-spine 11/16/20\par 1. Possible previous left laminotomy at L3-L4. There is apparent left foraminal herniation\par of the disc at this level, superimposed upon annular bulging and posterolateral vertebral\par spurring, with foraminal encroachment, more marked on the left, and impingement of the\par exiting left L3 nerve root. Differential considerations for the apparent left foraminal\par disc herniation include postsurgical scarring, in the appropriate clinical setting. Please\par correlate with the surgical history.\par 2. Evidence of previous left laminotomy at L4-L5. There is abnormal left foraminal soft\par tissue, superimposed upon annular bulging/vertebral spurring. Findings result in foraminal\par encroachment, worse on the left, with left L4 nerve root impingement. Differential\par considerations for the left foraminal soft tissue include postsurgical scarring.\par 3. Broad posterocentral disc herniation at L5-S1, superimposed upon annular bulging,\par slightly asymmetric toward the right, associated with Modic type I endplate changes as\par well as bilateral facet joint arthropathy. Findings result in:\par A) canal encroachment with thecal sac impingement.\par B) bilateral lateral recess encroachment.\par C) bilateral foraminal encroachment, slightly more marked on the right, with right L5\par nerve root impingement.\par 4. Lumbar straightening, which may be related to muscle spasm/pain.\par \par When compared to the previous study of April 2019, there has been interim development of\par Modic type I endplate changes at L5-S1. There has otherwise been no significant interval\par change in the appearance of the lumbar spine. [] : no [FreeTextEntry3] : 7/27/15 [FreeTextEntry5] : Patient is a  and he was picking up hazardous material. While doing so he felt popping. [FreeTextEntry7] : down his leg  [de-identified] : 2015-present [de-identified] : bending, reaching [de-identified] : Dr Lerman [de-identified] : 2018 [de-identified] : MRI [de-identified] :

## 2022-06-07 ENCOUNTER — RESULT REVIEW (OUTPATIENT)
Age: 42
End: 2022-06-07

## 2022-06-22 ENCOUNTER — APPOINTMENT (OUTPATIENT)
Dept: ORTHOPEDIC SURGERY | Facility: CLINIC | Age: 42
End: 2022-06-22
Payer: OTHER MISCELLANEOUS

## 2022-06-22 DIAGNOSIS — M54.16 RADICULOPATHY, LUMBAR REGION: ICD-10-CM

## 2022-06-22 PROCEDURE — 99215 OFFICE O/P EST HI 40 MIN: CPT

## 2022-06-22 PROCEDURE — 99072 ADDL SUPL MATRL&STAF TM PHE: CPT

## 2022-06-22 NOTE — DISCUSSION/SUMMARY
[de-identified] : reviewed the updated MRi with him - tough case with prior decompressoin at L3-4 and L4-5 - persistent symptoms - unclear which level causing the issue - could address l3-4 via lateral - to get to L4-5 would require either alif or revision back to get to it \par \par other option would be SCS \par \par we discussed the r/b/e of the procedures - he is going to think about it \par \par I think from a surgical perspective addressing the L3-4 initally would make the most sense as its a somewhat "smaller" operation and the pathology best tracks to L3-4 though its hard to exactly tell \par \par if we did L3-4 lateral we would try to get l4-5 lateral as well but given the anatomy not sure that would work

## 2022-06-22 NOTE — PHYSICAL EXAM
[4___] : left extensor hallicus longus 4[unfilled]/5 [] : positive equivocal straight leg raise [Left lower extremity above knee] : left lower extremity above knee

## 2022-09-14 NOTE — H&P ADULT - NSICDXPASTSURGICALHX_GEN_ALL_CORE_FT
PAST SURGICAL HISTORY:  History of lumbar laminectomy 5-Fu Pregnancy And Lactation Text: This medication is Pregnancy Category X and contraindicated in pregnancy and in women who may become pregnant. It is unknown if this medication is excreted in breast milk.

## 2023-08-21 NOTE — ASU PREOP CHECKLIST - SPO2 (%)
Lisinopril: 5/24/23 #90 w/ 0 refills  Amlodipine: 5/24/23 #90 w/ 0 refills    Last OV: 8/16/23  Last labs: 2/4/22    No future appointments. 94

## 2024-02-29 NOTE — DISCHARGE NOTE PROVIDER - NSDCDCMDCOMP_GEN_ALL_CORE
How Severe Are Your Spot(S)?: mild
Hpi Title: Evaluation of a Skin Lesion
This document is complete and the patient is ready for discharge.

## 2025-07-08 RX ORDER — AMOXICILLIN AND CLAVULANATE POTASSIUM 500; 125 MG/1; MG/1
1 TABLET, FILM COATED ORAL
Refills: 0 | DISCHARGE
Start: 2025-07-08 | End: 2025-07-22

## 2025-07-11 ENCOUNTER — INPATIENT (INPATIENT)
Facility: HOSPITAL | Age: 45
LOS: 1 days | Discharge: HOME | DRG: 383 | End: 2025-07-13
Attending: INTERNAL MEDICINE | Admitting: HOSPITALIST
Payer: MEDICAID

## 2025-07-11 VITALS — HEIGHT: 72 IN

## 2025-07-11 DIAGNOSIS — Z98.890 OTHER SPECIFIED POSTPROCEDURAL STATES: Chronic | ICD-10-CM

## 2025-07-11 DIAGNOSIS — L02.612 CUTANEOUS ABSCESS OF LEFT FOOT: ICD-10-CM

## 2025-07-11 LAB
ALBUMIN SERPL ELPH-MCNC: 4.5 G/DL — SIGNIFICANT CHANGE UP (ref 3.3–5)
ALP SERPL-CCNC: 68 U/L — SIGNIFICANT CHANGE UP (ref 40–120)
ALT FLD-CCNC: 37 U/L — SIGNIFICANT CHANGE UP (ref 12–78)
ANION GAP SERPL CALC-SCNC: 4 MMOL/L — LOW (ref 5–17)
APTT BLD: 33.5 SEC — SIGNIFICANT CHANGE UP (ref 26.1–36.8)
AST SERPL-CCNC: 26 U/L — SIGNIFICANT CHANGE UP (ref 15–37)
BASOPHILS # BLD AUTO: 0.03 K/UL — SIGNIFICANT CHANGE UP (ref 0–0.2)
BASOPHILS NFR BLD AUTO: 1.1 % — SIGNIFICANT CHANGE UP (ref 0–2)
BILIRUB SERPL-MCNC: 0.6 MG/DL — SIGNIFICANT CHANGE UP (ref 0.2–1.2)
BUN SERPL-MCNC: 13 MG/DL — SIGNIFICANT CHANGE UP (ref 7–23)
CALCIUM SERPL-MCNC: 9.5 MG/DL — SIGNIFICANT CHANGE UP (ref 8.5–10.1)
CHLORIDE SERPL-SCNC: 106 MMOL/L — SIGNIFICANT CHANGE UP (ref 96–108)
CO2 SERPL-SCNC: 29 MMOL/L — SIGNIFICANT CHANGE UP (ref 22–31)
CREAT SERPL-MCNC: 0.82 MG/DL — SIGNIFICANT CHANGE UP (ref 0.5–1.3)
CRP SERPL-MCNC: <2.9 MG/L — SIGNIFICANT CHANGE UP (ref 0–5)
EGFR: 110 ML/MIN/1.73M2 — SIGNIFICANT CHANGE UP
EGFR: 110 ML/MIN/1.73M2 — SIGNIFICANT CHANGE UP
EOSINOPHIL # BLD AUTO: 0.04 K/UL — SIGNIFICANT CHANGE UP (ref 0–0.5)
EOSINOPHIL NFR BLD AUTO: 1.4 % — SIGNIFICANT CHANGE UP (ref 0–6)
ERYTHROCYTE [SEDIMENTATION RATE] IN BLOOD: 13 MM/HR — SIGNIFICANT CHANGE UP (ref 0–15)
GLUCOSE SERPL-MCNC: 93 MG/DL — SIGNIFICANT CHANGE UP (ref 70–99)
HCT VFR BLD CALC: 41.3 % — SIGNIFICANT CHANGE UP (ref 39–50)
HGB BLD-MCNC: 13.8 G/DL — SIGNIFICANT CHANGE UP (ref 13–17)
IMM GRANULOCYTES # BLD AUTO: 0.01 K/UL — SIGNIFICANT CHANGE UP (ref 0–0.07)
IMM GRANULOCYTES NFR BLD AUTO: 0.4 % — SIGNIFICANT CHANGE UP (ref 0–0.9)
INR BLD: 1.03 RATIO — SIGNIFICANT CHANGE UP (ref 0.85–1.16)
LYMPHOCYTES # BLD AUTO: 1.04 K/UL — SIGNIFICANT CHANGE UP (ref 1–3.3)
LYMPHOCYTES NFR BLD AUTO: 36.5 % — SIGNIFICANT CHANGE UP (ref 13–44)
MANUAL SMEAR VERIFICATION: SIGNIFICANT CHANGE UP
MCHC RBC-ENTMCNC: 29 PG — SIGNIFICANT CHANGE UP (ref 27–34)
MCHC RBC-ENTMCNC: 33.4 G/DL — SIGNIFICANT CHANGE UP (ref 32–36)
MCV RBC AUTO: 86.8 FL — SIGNIFICANT CHANGE UP (ref 80–100)
MONOCYTES # BLD AUTO: 0.29 K/UL — SIGNIFICANT CHANGE UP (ref 0–0.9)
MONOCYTES NFR BLD AUTO: 10.2 % — SIGNIFICANT CHANGE UP (ref 2–14)
NEUTROPHILS # BLD AUTO: 1.44 K/UL — LOW (ref 1.8–7.4)
NEUTROPHILS NFR BLD AUTO: 50.4 % — SIGNIFICANT CHANGE UP (ref 43–77)
NRBC # BLD AUTO: 0 K/UL — SIGNIFICANT CHANGE UP (ref 0–0)
NRBC # FLD: 0 K/UL — SIGNIFICANT CHANGE UP (ref 0–0)
NRBC BLD AUTO-RTO: 0 /100 WBCS — SIGNIFICANT CHANGE UP (ref 0–0)
PLAT MORPH BLD: NORMAL — SIGNIFICANT CHANGE UP
PLATELET # BLD AUTO: 154 K/UL — SIGNIFICANT CHANGE UP (ref 150–400)
PMV BLD: 11.8 FL — SIGNIFICANT CHANGE UP (ref 7–13)
POTASSIUM SERPL-MCNC: 3.7 MMOL/L — SIGNIFICANT CHANGE UP (ref 3.5–5.3)
POTASSIUM SERPL-SCNC: 3.7 MMOL/L — SIGNIFICANT CHANGE UP (ref 3.5–5.3)
PROT SERPL-MCNC: 8.1 GM/DL — SIGNIFICANT CHANGE UP (ref 6–8.3)
PROTHROM AB SERPL-ACNC: 11.8 SEC — SIGNIFICANT CHANGE UP (ref 9.9–13.4)
RBC # BLD: 4.76 M/UL — SIGNIFICANT CHANGE UP (ref 4.2–5.8)
RBC # FLD: 12.7 % — SIGNIFICANT CHANGE UP (ref 10.3–14.5)
RBC BLD AUTO: NORMAL — SIGNIFICANT CHANGE UP
SODIUM SERPL-SCNC: 139 MMOL/L — SIGNIFICANT CHANGE UP (ref 135–145)
WBC # BLD: 2.85 K/UL — LOW (ref 3.8–10.5)
WBC # FLD AUTO: 2.85 K/UL — LOW (ref 3.8–10.5)
WBC MORPHOLOGY: NORMAL — SIGNIFICANT CHANGE UP

## 2025-07-11 PROCEDURE — 87070 CULTURE OTHR SPECIMN AEROBIC: CPT

## 2025-07-11 PROCEDURE — 87075 CULTR BACTERIA EXCEPT BLOOD: CPT

## 2025-07-11 PROCEDURE — 85027 COMPLETE CBC AUTOMATED: CPT

## 2025-07-11 PROCEDURE — 99285 EMERGENCY DEPT VISIT HI MDM: CPT

## 2025-07-11 PROCEDURE — 88304 TISSUE EXAM BY PATHOLOGIST: CPT

## 2025-07-11 PROCEDURE — 80053 COMPREHEN METABOLIC PANEL: CPT

## 2025-07-11 PROCEDURE — 73630 X-RAY EXAM OF FOOT: CPT | Mod: 26,LT

## 2025-07-11 PROCEDURE — 36415 COLL VENOUS BLD VENIPUNCTURE: CPT

## 2025-07-11 PROCEDURE — 87102 FUNGUS ISOLATION CULTURE: CPT

## 2025-07-11 PROCEDURE — 87077 CULTURE AEROBIC IDENTIFY: CPT

## 2025-07-11 PROCEDURE — 88312 SPECIAL STAINS GROUP 1: CPT

## 2025-07-11 PROCEDURE — 99222 1ST HOSP IP/OBS MODERATE 55: CPT

## 2025-07-11 PROCEDURE — 80202 ASSAY OF VANCOMYCIN: CPT

## 2025-07-11 PROCEDURE — 93005 ELECTROCARDIOGRAM TRACING: CPT

## 2025-07-11 RX ORDER — VANCOMYCIN HCL IN 5 % DEXTROSE 1.5G/250ML
1000 PLASTIC BAG, INJECTION (ML) INTRAVENOUS ONCE
Refills: 0 | Status: COMPLETED | OUTPATIENT
Start: 2025-07-11 | End: 2025-07-11

## 2025-07-11 RX ORDER — MAGNESIUM, ALUMINUM HYDROXIDE 200-200 MG
30 TABLET,CHEWABLE ORAL EVERY 4 HOURS
Refills: 0 | Status: DISCONTINUED | OUTPATIENT
Start: 2025-07-11 | End: 2025-07-13

## 2025-07-11 RX ORDER — CEFTRIAXONE 500 MG/1
1000 INJECTION, POWDER, FOR SOLUTION INTRAMUSCULAR; INTRAVENOUS EVERY 24 HOURS
Refills: 0 | Status: DISCONTINUED | OUTPATIENT
Start: 2025-07-11 | End: 2025-07-13

## 2025-07-11 RX ORDER — CEFTRIAXONE 500 MG/1
1000 INJECTION, POWDER, FOR SOLUTION INTRAMUSCULAR; INTRAVENOUS ONCE
Refills: 0 | Status: COMPLETED | OUTPATIENT
Start: 2025-07-11 | End: 2025-07-11

## 2025-07-11 RX ORDER — ONDANSETRON HCL/PF 4 MG/2 ML
4 VIAL (ML) INJECTION EVERY 8 HOURS
Refills: 0 | Status: DISCONTINUED | OUTPATIENT
Start: 2025-07-11 | End: 2025-07-13

## 2025-07-11 RX ORDER — OXYCODONE HYDROCHLORIDE 30 MG/1
5 TABLET ORAL EVERY 6 HOURS
Refills: 0 | Status: DISCONTINUED | OUTPATIENT
Start: 2025-07-11 | End: 2025-07-13

## 2025-07-11 RX ORDER — MELATONIN 5 MG
3 TABLET ORAL AT BEDTIME
Refills: 0 | Status: DISCONTINUED | OUTPATIENT
Start: 2025-07-11 | End: 2025-07-13

## 2025-07-11 RX ORDER — OXYCODONE HYDROCHLORIDE 30 MG/1
10 TABLET ORAL EVERY 6 HOURS
Refills: 0 | Status: DISCONTINUED | OUTPATIENT
Start: 2025-07-11 | End: 2025-07-13

## 2025-07-11 RX ORDER — ACETAMINOPHEN 500 MG/5ML
650 LIQUID (ML) ORAL ONCE
Refills: 0 | Status: COMPLETED | OUTPATIENT
Start: 2025-07-11 | End: 2025-07-13

## 2025-07-11 RX ORDER — VANCOMYCIN HCL IN 5 % DEXTROSE 1.5G/250ML
1250 PLASTIC BAG, INJECTION (ML) INTRAVENOUS EVERY 12 HOURS
Refills: 0 | Status: DISCONTINUED | OUTPATIENT
Start: 2025-07-11 | End: 2025-07-13

## 2025-07-11 RX ADMIN — Medication 166.67 MILLIGRAM(S): at 21:56

## 2025-07-11 RX ADMIN — Medication 1000 MILLILITER(S): at 13:28

## 2025-07-11 RX ADMIN — CEFTRIAXONE 1000 MILLIGRAM(S): 500 INJECTION, POWDER, FOR SOLUTION INTRAMUSCULAR; INTRAVENOUS at 13:29

## 2025-07-11 RX ADMIN — Medication 166.67 MILLIGRAM(S): at 13:36

## 2025-07-11 NOTE — H&P ADULT - NSHPLABSRESULTS_GEN_ALL_CORE
07-11-25 @ 13:05  Glucose 93 [70 - 99]  CO2 total 29 [22 - 31]  Chloride 106 [96 - 108]  Sodium 139 [135 - 145]  Potassium 3.7 [3.5 - 5.3]  Calcium 9.5 [8.5 - 10.1]  Creatinine 0.82 [0.50 - 1.30]  BUN 13 [7 - 23]  eGFR 110  Anion gap 4 [5 - 17]  AST 26 [15 - 37]  ALT 37 [12 - 78]  Alk phos 68 [40 - 120]  Albumin 4.5 [3.3 - 5.0]    WBC 2.85 [3.80 - 10.50]  Hemoglobin 13.8 [13.0 - 17.0]  Hematocrit 41.3 [39.0 - 50.0]  Platelets 154 [150 - 400]    Left foot X-ray -- pending

## 2025-07-11 NOTE — H&P ADULT - HISTORY OF PRESENT ILLNESS
45 y/o M with no PMHx presents with redness/swelling/pain to left great toe with onset 6 weeks ago. Pt had ingrown toe nail procedure done about 6 weeks back, was placed on abx after the procedure. Pt states there was no improvement in the pain and drainage, his abx were changed to bactrim (2weeks) which did not help him with the infection as well. Pt currently on Augmentin. Pt states he has pain to the area along with drainage. He was seen by Podiatry Dr Ruth in the office this morning and rec'd for bedside procedure, however pt refused for any bedside intervention and is amendable for only OR. Patient otherwise feels well, and denies chest pain, SOB, f/c/n/v/d, abd pain, LE edema, dysuria, HA, dizziness.    In ED, VSS and pt afebrile. Labs notable for WBC 2.9, ESR/CRP negative, BMP Unremarkable. Left foot Xray read pending. Seen by Podiatry and plan for OR today for left hallux I&D. Received IV CTX + Vanco and 1L IVF in ED, admitted to med/surg for further management.

## 2025-07-11 NOTE — ED ADULT TRIAGE NOTE - CHIEF COMPLAINT QUOTE
normal... Pt c/o L greater toe infection. States he had an ingrown toe nail removed 6 weeks ago and infection persists despite completing Augmentin and Bactrim courses. No fevers. Well appearing, awake, alert, oriented to person, place, time/situation and in no apparent distress.

## 2025-07-11 NOTE — H&P ADULT - ASSESSMENT
43 y/o M with no PMHx presents with redness/swelling/pain to left great toe with onset 6 weeks ago. Pt had ingrown toe nail procedure done about 6 weeks back, was placed on abx after the procedure. Pt states there was no improvement in the pain and drainage, his abx were changed to bactrim (2weeks) which did not help him with the infection as well. Pt currently on Augmentin. Pt states he still has pain to the area along with drainage. Admitted for:    #Left great toe cellulitis with abscess  - admit to med/surg  - failed multiple outpatient oral abx  - Podiatry consulted Dr. Ruth, plan for OR today for left hallux I&D. Patient is medically optimized for planned procedure  - NPO for OR  - ESR/CRP negative  - F/u left foot Xray read  - s/p IV CTX + vanco in ED  - continue pain control PRN  - local wound care   - Monitor CBC    #DVT ppx  - low risk, ambulate     45 y/o M with no PMHx presents with redness/swelling/pain to left great toe with onset 6 weeks ago. Pt had ingrown toe nail procedure done about 6 weeks back, was placed on abx after the procedure. Pt states there was no improvement in the pain and drainage, his abx were changed to bactrim (2weeks) which did not help him with the infection as well. Pt currently on Augmentin. Pt states he still has pain to the area along with drainage. Admitted for:    #Left great toe cellulitis with abscess  - admit to med/surg  - failed multiple outpatient oral abx  - Podiatry consulted Dr. Ruth, plan for OR today for left hallux I&D. Patient is medically optimized for planned procedure  - NPO for OR  - ESR/CRP negative  - F/u left foot Xray read  - Continue IV CTX + vancomycin for now, pending ID consult  - F/u OR cultures   - continue pain control PRN  - local wound care   - Monitor CBC    #DVT ppx  - low risk, ambulate     45 y/o M with no PMHx presents with redness/swelling/pain to left great toe with onset 6 weeks ago. Pt had ingrown toe nail procedure done about 6 weeks back, was placed on abx after the procedure. Pt states there was no improvement in the pain and drainage, his abx were changed to bactrim (2weeks) which did not help him with the infection as well. Pt currently on Augmentin. Pt states he still has pain to the area along with drainage. Admitted for:    #Left great toe cellulitis with abscess  - admit to med/surg  - failed multiple outpatient oral abx  - Podiatry consulted Dr. Ruht, plan for OR today for left hallux I&D. Patient is medically optimized for planned procedure  - NPO for OR  - ESR/CRP negative  - F/u left foot Xray read  - Continue IV CTX + vancomycin for now, pending ID consult  - F/u OR cultures   - F/u BCx   - continue pain control PRN  - local wound care   - Monitor CBC    #DVT ppx  - low risk, ambulate

## 2025-07-11 NOTE — ED STATDOCS - WR ORDER ID 1
Outreach attempt was made to schedule a Medicare Wellness Visit. This was the first attempt. Contact was not made, no answer/busy.   Note in next appointment to schedule MWV  
[id number]

## 2025-07-11 NOTE — ED ADULT NURSE NOTE - CHIEF COMPLAINT QUOTE
Pt c/o L greater toe infection. States he had an ingrown toe nail removed 6 weeks ago and infection persists despite completing Augmentin and Bactrim courses. No fevers.

## 2025-07-11 NOTE — H&P ADULT - NS PANP COMMENT GEN_ALL_CORE FT
43 y/o M with no PMHx presents with redness/swelling/pain to left great toe with onset 6 weeks ago. Pt had ingrown toe nail procedure done about 6 weeks back, was placed on abx after the procedure. Pt states there was no improvement in the pain and drainage, his abx were changed to bactrim (2weeks) which did not help him with the infection as well. Pt currently on Augmentin. Pt states he still has pain to the area along with drainage. Admitted for:    #Left great toe cellulitis with abscess  - admit to med/surg  - failed multiple outpatient oral abx  - Podiatry consulted Dr. Ruth, plan for OR today for left hallux I&D. Patient is medically optimized for planned procedure  - NPO for OR  - ESR/CRP negative  - F/u left foot Xray read  - Continue IV CTX + vancomycin for now, pending ID consult  - F/u OR cultures   - F/u BCx   - continue pain control PRN  - local wound care   - Monitor CBC    Time spent:  50 min spent during this encounter including but not limited to chart review, external record review,  labs/imaging (both on independent interpretation and official review by radiology), medication reconciliation,  coordination of care and discussion with consultants. (Not including goc discussion)  Further tests such as imaging and blood test, medications, and procedures have been ordered as indicated. Laboratory results and the plan of care were communicated to the patient and family.    Time spent was including but not limited to:  - Reviewing, and interpreting labs and testing.  - Independently obtaining a review of systems and performing a physical exam  - Reviewing consultant documentation/recommendations in addition to discussing plan of care with consultants.  - Counselling and educating patient and family regarding interpretation of aforementioned items and plan of care.

## 2025-07-11 NOTE — H&P ADULT - NSHPPHYSICALEXAM_GEN_ALL_CORE
Constitutional: NAD, awake and alert  HEENT: PERRLA, EOMI, MMM  Respiratory: Breath sounds are clear bilaterally, No wheezing, rales or rhonchi  Cardiovascular: S1 and S2, RRR, no murmurs, gallops or rubs  Gastrointestinal: +BS, soft, non-tender, non-distended, no CVA tenderness  Extremities: No peripheral edema, +DP pulses b/l  Neurological: A&O x 3, no focal deficits  Musculoskeletal: 5/5 strength b/l upper and lower extremities  Skin: Normal, skin warm and dry

## 2025-07-11 NOTE — PATIENT PROFILE ADULT - TOBACCO USE
Never smoker Detail Level: Zone Continue Regimen: ketoconazole 2 % shampoo \\nSig: Wash scalp two times a week. Leave on for five minutes, then rinse.\\n\\nketoconazole 2 % topical cream \\nSig: Apply to affected areas of the face and ears twice daily. Avoid mucosal membrane (mouth) and eyes. Initiate Treatment: tacrolimus 0.03 % topical ointment \\nSig: Apply twice daily to affected areas of the face and ears for 30 days. Avoid mucosal membrane (mouth) and eyes.

## 2025-07-11 NOTE — ED ADULT NURSE NOTE - OBJECTIVE STATEMENT
presenting to the ed C/O L big toe swelling. Pt states recent ingrown toe removal with increased redness, swelling and drainage. Pt was placed on abx without relief. pt was advised to report to the ed for further eval. denies fevers or chills

## 2025-07-11 NOTE — CONSULT NOTE ADULT - ASSESSMENT
A: 44yr old male seen for the following:   -Left hallux abscess  -Left foot pain    P:   Chart reviewed and Patient evaluated;  Discussed diagnosis and treatment with patient  X-rays reviewed : on wet read showing no acute radiographic findings  Pt with small area of fluctuance to the distal hallux, some oozing noted from the nail border with sever tenderness to palpation  Discussed tx options which include bedside total nail avulsion and I&D; pt is not amendable for any bedside procedure  Plan for OR today as add-on pending medical clearance for Left hallux I&D  Pt to remain NPO  Applied betadine DSD  WBAT to LLE using surgical shoe  Continue with abx per med  All additional care per Med appreciated  Patient demonstrated verbal understanding of all interventions and tolerated interventions well without any complications.   Podiatry will follow while in house  Case D/W attending Dr. Ruth A: 44yr old male seen for the following:   -Left hallux abscess  -Left foot pain    P:   Chart reviewed and Patient evaluated;  Discussed diagnosis and treatment with patient  X-rays reviewed : on wet read showing no acute radiographic findings  Pt with small area of fluctuance to the distal hallux, some oozing noted from the nail border with sever tenderness to palpation  Discussed tx options which include bedside total nail avulsion and I&D; pt is not amendable for any bedside procedure  Discussed with ED for potential conscious sedation for the procedure, but not available currently  Plan for OR today as add-on pending medical clearance for Left hallux I&D  Pt to remain NPO  Applied betadine DSD  WBAT to LLE using surgical shoe  Continue with abx per med  All additional care per Med appreciated  Patient demonstrated verbal understanding of all interventions and tolerated interventions well without any complications.   Podiatry will follow while in house  Case D/W attending Dr. Ruth

## 2025-07-11 NOTE — ED STATDOCS - DATE/TIME 2
Dear Asher Lynn,    Your symptoms show that you may have coronavirus (COVID-19). This illness can cause fever, cough and trouble breathing. Many people get a mild case and get better on their own. Some people can get very sick.    Because you also reported sore throat I would like to also test you for Strep Throat to determine if we need to treat you for that as well.    What should I do?  We would like to test you for Covid-19 virus and Strep Throat. I have placed orders for these tests.     For all employees or close contacts (except [place] and [employer] - see below), go to your [employer] home page and scroll down to the section that says  You have an appointment that needs to be scheduled  and click the large green button that says  Schedule Now  and follow the steps to find the next available opening.  It is important that when you are asked what the reason for your appointment is that you mention you need BOTH Covid and Strep tests.  tests.     If you are unable to complete these steps or if you cannot find any available times, please call [phone] to schedule employee testing.     [place] employees or close contacts, please call [phone].   [place] ([employer]) employees or close contacts call [phone].    Return to work/school/ guidance:  Please let your workplace manager and staffing office know when your quarantine ends     We can t give you an exact date as it depends on the above. You can calculate this on your own or work with your manager/staffing office to calculate this. (For example if you were exposed on 10/4, you would have to quarantine for 14 full days. That would be through 10/18. You could return on 10/19.)      If you receive a positive COVID-19 test result, follow the guidance of the those who are giving you the results. Usually the return to work is 10 (or in some cases 20 days from symptom onset.) If you work at [employer], you must also be cleared by  Employee Occupational Health and Safety to return to work.        If you receive a negative COVID-19 test result and did not have a high risk exposure to someone with a known positive COVID-19 test, you can return to work once you're free of fever for 24 hours without fever-reducing medication and your symptoms are improving or resolved.      If you receive a negative COVID-19 test and If you had a high risk exposure to someone who has tested positive for COVID-19 then you can return to work 14 days after your last contact with the positive individual    Note: If you have ongoing exposure to the covid positive person, this quarantine period may be more than 14 days. (For example, if you are continued to be exposed to your child who tested positive and cannot isolate from them, then the quarantine of 7-14 days can't start until your child is no longer contagious. This is typically 10 days from onset of the child's symptoms. So the total duration may be 17-24 days in this case.)    Sign up for [employer].   We know it's scary to hear that you might have COVID-19. We want to track your symptoms to make sure you're okay over the next 2 weeks. Please look for an email from [employer]--this is a free, online program that we'll use to keep in touch. To sign up, follow the link in the email you will receive. Learn more at [email or web address]    How can I take care of myself?    Get lots of rest. Drink extra fluids (unless a doctor has told you not to)    Take Tylenol (acetaminophen) or ibuprofen for fever or pain. If you have liver or kidney problems, ask your family doctor if it's okay to take Tylenol o ibuprofen    If you have other health problems (like cancer, heart failure, an organ transplant or severe kidney disease): Call your specialty clinic if you don't feel better in the next 2 days.    Know when to call 911. Emergency warning signs include:  o Trouble breathing or shortness of breath  o Pain  or pressure in the chest that doesn't go away  o Feeling confused like you haven't felt before, or not being able to wake up  o Bluish-colored lips or face    Where can I get more information?  [facility] - About COVID-19:   [email or web address]    CDC - What to Do If You're Sick:   www.cdc.gov/coronavirus/2019-ncov/about/steps-when-sick.html       11-Jul-2025 13:17

## 2025-07-11 NOTE — PATIENT PROFILE ADULT - FALL HARM RISK - HARM RISK INTERVENTIONS

## 2025-07-11 NOTE — ED STATDOCS - OBJECTIVE STATEMENT
45 y/o male with no pertinent PMHx presents to the ED c/o left great toe pain and infection onset 6 weeks ago. Pt had piece of left great toe removed and after was feeling diaphoretic and had ringing in the ears. Pt here as the toe pain is persisting w/o relief from antibiotics. Pt was recommened to come into the ED by podiatrist Dr Ruth.

## 2025-07-11 NOTE — ED STATDOCS - ATTENDING APP SHARED VISIT CONTRIBUTION OF CARE
I,Bakari Riley MD,  performed the initial face to face bedside interview with this patient regarding history of present illness, review of symptoms and relevant past medical, social and family history.  I completed an independent physical examination.  I was the initial provider who evaluated this patient. I have signed out the follow up of any pending tests (i.e. labs, radiological studies) to the ACP.  I have communicated the patient’s plan of care and disposition with the ACP.  The history, relevant review of systems, past medical and surgical history, medical decision making, and physical examination was documented by the scribe in my presence and I attest to the accuracy of the documentation.

## 2025-07-11 NOTE — PROVIDER CONTACT NOTE (OTHER) - SITUATION
Pt is going for procedure tmrw, NPO after midnight. Pt requests to eat prior to then, no diet order in system.

## 2025-07-11 NOTE — ED STATDOCS - CLINICAL SUMMARY MEDICAL DECISION MAKING FREE TEXT BOX
Pt was following up with outpatient podiatrist, however is no improving on PO antibiotics. Will check labs and do podiatry consult.

## 2025-07-11 NOTE — CONSULT NOTE ADULT - SUBJECTIVE AND OBJECTIVE BOX
Date of consult: 7/11/2025    HPI: 45 y/o male with no pertinent PMHx presents to the ED c/o left great toe pain and infection onset 6 weeks ago. Pt had ingrown toe nail procedure done about 6 weeks back, was placed on abx after the procedure. Pt states there was not improvement in the pain and drainage, his abx were changed to bactrim (2weeks) which did not help him with the infection as well. Pt currently on Augmentin. Pt states he has pain to the area along with drainage. He was seen by Dr bustamante in the office today AM and [facility] for bedside procedure, however pt refused for any bedside intervention and is amendable for only OR.     PMH: No pertinent past medical history    PSH:History of lumbar laminectomy    Allergies:No Known Allergies    Labs:                     13.8   2.85  )-----------( 154      ( 11 Jul 2025 13:05 )             41.3     WBC Trend  2.85[L] Date (07-11 @ 13:05)    Chem  07-11    139  |  106  |  13  ----------------------------<  93  3.7   |  29  |  0.82    Ca    9.5      11 Jul 2025 13:05    TPro  8.1  /  Alb  4.5  /  TBili  0.6  /  DBili  x   /  AST  26  /  ALT  37  /  AlkPhos  68  07-11    T(F): 98.3 (07-11-25 @ 12:45), Max: 98.3 (07-11-25 @ 12:45)  HR: 64 (07-11-25 @ 12:45) (64 - 64)  BP: 133/78 (07-11-25 @ 12:45) (133/78 - 133/78)  RR: 19 (07-11-25 @ 12:45) (19 - 19)  SpO2: 100% (07-11-25 @ 12:45) (100% - 100%)  Wt(kg): --    REVIEW OF SYSTEMS:    CONSTITUTIONAL: No weakness, fevers or chills  EYES: No visual changes  RESPIRATORY: No cough, wheezing; No shortness of breath  CARDIOVASCULAR: No chest pain or palpitations  GASTROINTESTINAL: No abdominal or epigastric pain. No nausea, vomiting; No diarrhea or constipation.   GENITOURINARY: No dysuria, frequency or hematuria  NEUROLOGICAL: No numbness or weakness  SKIN: See physical examination.  All other review of systems is negative unless indicated above    Physical Exam:   Constitutional: NAD, alert;  Lower Extremity Focus  Derm:  Skin warm, dry and supple Left foot  Left hallux erythema and edema noted, fluctuance noted to the proximal area of the nail bed, some drainage noted oozing from the lateral nail border without any malodor,   Vascular: Dorsalis Pedis and Posterior Tibial pulses 2/4 left foot  Neuro: Protective sensation intact to the level of the digits left foot  MSK: Muscle strength 5/5 all major muscle groups bilateral. +TTP to the left hallux

## 2025-07-11 NOTE — PROVIDER CONTACT NOTE (OTHER) - DATE AND TIME:
----- Message from Radha Singh sent at 8/1/2022  9:32 AM CDT -----  Contact: Pt [phone]  Pt called in regards to getting a letter for her job stating that she will be going on leave on 08/31/22 and she will be returning after she gives birth in 6 weeks she would like to  please advise     
Left message for patient to return call to [phone].    
11-Jul-2025 20:46

## 2025-07-11 NOTE — ED STATDOCS - PHYSICAL EXAMINATION
Physical Exam:  Gen: NAD, non-toxic appearing, able to ambulate without assistance  Head: NCAT  HEENT: EOMI, PEERLA, normal conjunctiva, tongue midline, oral mucosa moist  Lung: CTAB, no respiratory distress, no wheezes/rhonchi/rales B/L, speaking in full sentences  CV: RRR, no murmurs, rubs or gallops, distal pulses 2+ b/l  Abd: soft, nontender, no distention, no guarding, no rigidity, no rebound tenderness  MSK: no visible deformities, ROM normal in UE/LE, left first toe redness to distal phalanx, fluctuance at base of nail, no deformity, sensations intact, neurovascularly intact, no streaking, no foot swelling   Skin: Warm, well perfused, no rash  Psych: normal affect, calm

## 2025-07-11 NOTE — ED STATDOCS - PROGRESS NOTE DETAILS
Podiatry called for consult. - Rufus Jalloh, PAC 45 y/o M with no PMH presents with redness/swelling to left 1st toe for apx 6 weeks. Has not improved with PO antibiotics. Went to podiatry today, had ?pus expressed, but has persistent As per podiatry, patient to require I&D in OR, requesting admission and medical clearance. Pt accepted for admission by Dr. Bustillos. - Rufus Jalloh PA-C 45 y/o M with no PMH presents with redness/swelling to left 1st toe for apx 6 weeks. Has not improved with PO antibiotics. Went to podiatry today, had ?pus expressed, but has persistent pain, swelling to area. Sent to ED by Dr. Ruth for assessment. Pt denies fever, chills. PE: Well appearing. MSK: +erythema and edema base of left 1st nailbed. Sensation intact to light touch in LE digits. Limited ROM in left 1st toe 2/2 swelling. Cap refill < 2 sec in LE digits. A/p paronychia/abscess, plan for labs, podiatry consult. - Rufus Jalloh PA-C

## 2025-07-12 LAB
ALBUMIN SERPL ELPH-MCNC: 3.8 G/DL — SIGNIFICANT CHANGE UP (ref 3.3–5)
ALP SERPL-CCNC: 58 U/L — SIGNIFICANT CHANGE UP (ref 40–120)
ALT FLD-CCNC: 32 U/L — SIGNIFICANT CHANGE UP (ref 12–78)
ANION GAP SERPL CALC-SCNC: 4 MMOL/L — LOW (ref 5–17)
AST SERPL-CCNC: 22 U/L — SIGNIFICANT CHANGE UP (ref 15–37)
BILIRUB SERPL-MCNC: 0.4 MG/DL — SIGNIFICANT CHANGE UP (ref 0.2–1.2)
BUN SERPL-MCNC: 14 MG/DL — SIGNIFICANT CHANGE UP (ref 7–23)
CALCIUM SERPL-MCNC: 9 MG/DL — SIGNIFICANT CHANGE UP (ref 8.5–10.1)
CHLORIDE SERPL-SCNC: 108 MMOL/L — SIGNIFICANT CHANGE UP (ref 96–108)
CO2 SERPL-SCNC: 30 MMOL/L — SIGNIFICANT CHANGE UP (ref 22–31)
CREAT SERPL-MCNC: 0.8 MG/DL — SIGNIFICANT CHANGE UP (ref 0.5–1.3)
EGFR: 111 ML/MIN/1.73M2 — SIGNIFICANT CHANGE UP
EGFR: 111 ML/MIN/1.73M2 — SIGNIFICANT CHANGE UP
GLUCOSE SERPL-MCNC: 115 MG/DL — HIGH (ref 70–99)
HCT VFR BLD CALC: 39.3 % — SIGNIFICANT CHANGE UP (ref 39–50)
HGB BLD-MCNC: 13.1 G/DL — SIGNIFICANT CHANGE UP (ref 13–17)
MCHC RBC-ENTMCNC: 29 PG — SIGNIFICANT CHANGE UP (ref 27–34)
MCHC RBC-ENTMCNC: 33.3 G/DL — SIGNIFICANT CHANGE UP (ref 32–36)
MCV RBC AUTO: 87.1 FL — SIGNIFICANT CHANGE UP (ref 80–100)
NRBC # BLD AUTO: 0 K/UL — SIGNIFICANT CHANGE UP (ref 0–0)
NRBC # FLD: 0 K/UL — SIGNIFICANT CHANGE UP (ref 0–0)
NRBC BLD AUTO-RTO: 0 /100 WBCS — SIGNIFICANT CHANGE UP (ref 0–0)
PLATELET # BLD AUTO: 145 K/UL — LOW (ref 150–400)
PMV BLD: 12.3 FL — SIGNIFICANT CHANGE UP (ref 7–13)
POTASSIUM SERPL-MCNC: 3.8 MMOL/L — SIGNIFICANT CHANGE UP (ref 3.5–5.3)
POTASSIUM SERPL-SCNC: 3.8 MMOL/L — SIGNIFICANT CHANGE UP (ref 3.5–5.3)
PROT SERPL-MCNC: 6.9 GM/DL — SIGNIFICANT CHANGE UP (ref 6–8.3)
RBC # BLD: 4.51 M/UL — SIGNIFICANT CHANGE UP (ref 4.2–5.8)
RBC # FLD: 12.6 % — SIGNIFICANT CHANGE UP (ref 10.3–14.5)
SODIUM SERPL-SCNC: 142 MMOL/L — SIGNIFICANT CHANGE UP (ref 135–145)
VANCOMYCIN TROUGH SERPL-MCNC: 10.8 UG/ML — SIGNIFICANT CHANGE UP (ref 10–20)
WBC # BLD: 3.13 K/UL — LOW (ref 3.8–10.5)
WBC # FLD AUTO: 3.13 K/UL — LOW (ref 3.8–10.5)

## 2025-07-12 PROCEDURE — 88304 TISSUE EXAM BY PATHOLOGIST: CPT | Mod: 26

## 2025-07-12 PROCEDURE — 88312 SPECIAL STAINS GROUP 1: CPT | Mod: 26

## 2025-07-12 PROCEDURE — 99232 SBSQ HOSP IP/OBS MODERATE 35: CPT

## 2025-07-12 PROCEDURE — 93010 ELECTROCARDIOGRAM REPORT: CPT

## 2025-07-12 RX ORDER — ACETAMINOPHEN 500 MG/5ML
1000 LIQUID (ML) ORAL ONCE
Refills: 0 | Status: DISCONTINUED | OUTPATIENT
Start: 2025-07-12 | End: 2025-07-12

## 2025-07-12 RX ORDER — SODIUM CHLORIDE 9 G/1000ML
1000 INJECTION, SOLUTION INTRAVENOUS
Refills: 0 | Status: DISCONTINUED | OUTPATIENT
Start: 2025-07-12 | End: 2025-07-12

## 2025-07-12 RX ORDER — FENTANYL CITRATE-0.9 % NACL/PF 100MCG/2ML
50 SYRINGE (ML) INTRAVENOUS
Refills: 0 | Status: DISCONTINUED | OUTPATIENT
Start: 2025-07-12 | End: 2025-07-12

## 2025-07-12 RX ORDER — FENTANYL CITRATE-0.9 % NACL/PF 100MCG/2ML
25 SYRINGE (ML) INTRAVENOUS
Refills: 0 | Status: DISCONTINUED | OUTPATIENT
Start: 2025-07-12 | End: 2025-07-12

## 2025-07-12 RX ORDER — KETOROLAC TROMETHAMINE 30 MG/ML
30 INJECTION, SOLUTION INTRAMUSCULAR; INTRAVENOUS ONCE
Refills: 0 | Status: DISCONTINUED | OUTPATIENT
Start: 2025-07-12 | End: 2025-07-12

## 2025-07-12 RX ORDER — ONDANSETRON HCL/PF 4 MG/2 ML
4 VIAL (ML) INJECTION ONCE
Refills: 0 | Status: DISCONTINUED | OUTPATIENT
Start: 2025-07-12 | End: 2025-07-12

## 2025-07-12 RX ADMIN — CEFTRIAXONE 1000 MILLIGRAM(S): 500 INJECTION, POWDER, FOR SOLUTION INTRAMUSCULAR; INTRAVENOUS at 13:52

## 2025-07-12 RX ADMIN — Medication 166.67 MILLIGRAM(S): at 22:21

## 2025-07-12 RX ADMIN — Medication 166.67 MILLIGRAM(S): at 11:05

## 2025-07-12 NOTE — CONSULT NOTE ADULT - ASSESSMENT
45 y/o M with no PMHx presents with redness/swelling/pain to left great toe with onset 6 weeks ago. Pt had ingrown toe nail procedure done about 6 weeks back, was placed on abx after the procedure. Pt states there was no improvement in the pain and drainage, his abx were changed to bactrim (2weeks) which did not help him with the infection as well. Pt currently on Augmentin. Pt states he has pain to the area along with drainage. He was seen by Podiatry Dr Ruth in the office this morning and rec'd for bedside procedure, however pt refused for any bedside intervention and is amendable for only OR. Patient otherwise feels well, and denies chest pain, SOB, f/c/n/v/d, abd pain, LE edema, dysuria, HA, dizziness.In ED, VSS and pt afebrile. Labs notable for WBC 2.9, ESR/CRP negative, BMP Unremarkable. Left foot Xray read pending. Seen by Podiatry and plan for OR today for left hallux I&D. Received IV CTX + Vanco and 1L IVF in ED, admitted to med/surg for further management.    L hallux cellulitis/abscess  - imaging reviewed  - podiatry eval noted plan for OR for I & D  - on vancomycin [id number] check trough prior to 4th dose  - on rocephin 1gm daily  - continue with abx coverage  - f/u cultures  - monitor temps  - tolerating abx well so far; no side effects noted  - reason for abx use and side effects reviewed with patient  - supportive care  - fu cbc    Concern for infection with multi-resistant organisms was raised. Prior cultures reviewed. An epidemiologic assessment was performed. There is a significant risk for resistant microorganisms to spread to family members, and/or healthcare staff. The patient will be placed on isolation according to infection control policy. Will reconsider isolation measures based on new culture results and other clinical data as appropriate. Appropriate cultures collected and an appropriate broad spectrum antibiotic therapy will be considered.    Clinical team may change from intravenous to oral antibiotics when the following criteria are met:   1. Patient is clinically improving/stable       a)	Improved signs and symptoms of infection from initial presentation       b)	Afebrile for 24 hours       c)	Leukocytosis trending towards normal range   2. Patient is tolerating oral intake   3. Initial/repeat blood cultures are negative     Cannot advise changing to oral antibiotic therapy until culture sensitivity is available.

## 2025-07-12 NOTE — CONSULT NOTE ADULT - SUBJECTIVE AND OBJECTIVE BOX
Patient is a 44y old  Male who presents with a chief complaint of left great toe pain (2025 15:08)    HPI:  43 y/o M with no PMHx presents with redness/swelling/pain to left great toe with onset 6 weeks ago. Pt had ingrown toe nail procedure done about 6 weeks back, was placed on abx after the procedure. Pt states there was no improvement in the pain and drainage, his abx were changed to bactrim (2weeks) which did not help him with the infection as well. Pt currently on Augmentin. Pt states he has pain to the area along with drainage. He was seen by Podiatry Dr Ruth in the office this morning and rec'd for bedside procedure, however pt refused for any bedside intervention and is amendable for only OR. Patient otherwise feels well, and denies chest pain, SOB, f/c/n/v/d, abd pain, LE edema, dysuria, HA, dizziness.In ED, VSS and pt afebrile. Labs notable for WBC 2.9, ESR/CRP negative, BMP Unremarkable. Left foot Xray read pending. Seen by Podiatry and plan for OR today for left hallux I&D. Received IV CTX + Vanco and 1L IVF in ED, admitted to med/surg for further management.      PMH: as above  PSH: as above  Meds: per reconciliation sheet, noted below  MEDICATIONS  (STANDING):  cefTRIAXone Injectable. 1000 milliGRAM(s) IV Push every 24 hours  vancomycin  IVPB 1250 milliGRAM(s) IV Intermittent every 12 hours      Allergies    No Known Allergies    Intolerances      Social: no smoking, no alcohol, no illegal drugs; no recent travel, no exposure to TB  FAMILY HISTORY:  No pertinent family history       no history of premature cardiovascular disease in first degree relatives    ROS: the patient denies fever, no chills, no HA, no dizziness, no sore throat, no blurry vision, no CP, no palpitations, no SOB, no cough, no abdominal pain, no diarrhea, no N/V, no dysuria, no leg pain, no claudication, no rash, no joint aches, no rectal pain or bleeding, no night sweats L toe tenderness pain    All other systems reviewed and are negative    Vital Signs Last 24 Hrs  T(C): 36.4 (2025 07:11), Max: 36.5 (2025 15:39)  T(F): 97.5 (2025 07:11), Max: 97.7 (2025 15:39)  HR: 52 (2025 07:11) (52 - 59)  BP: 111/74 (2025 07:11) (100/60 - 117/71)  BP(mean): 81 (2025 15:39) (81 - 81)  RR: 18 (2025 07:11) (17 - 18)  SpO2: 98% (2025 07:11) (98% - 100%)    Parameters below as of 2025 07:11  Patient On (Oxygen Delivery Method): room air      Daily     Daily Weight in k.3 (2025 16:43)    PE:    Constitutional: NAD  HEENT: NC/AT, EOMI, PERRLA, conjunctivae clear; ears and nose atraumatic; pharynx benign  Neck: supple; thyroid not palpable  Back: no tenderness  Respiratory: respiratory effort normal; clear to auscultation  Cardiovascular: S1S2 regular, no murmurs  Abdomen: soft, not tender, not distended, positive BS; liver and spleen WNL  Genitourinary: no suprapubic tenderness  Lymphatic: no LN palpable  Musculoskeletal: no muscle tenderness, no joint swelling or tenderness  Extremities: no pedal edema L great toe redness tenderness   Neurological/ Psychiatric: AxOx3, Judgement and insight normal;  moving all extremities  Skin: no rashes; no palpable lesions    Labs: all available labs reviewed                        13.1   3.13  )-----------( 145      ( 2025 06:46 )             39.3     07-12    142  |  108  |  14  ----------------------------<  115[H]  3.8   |  30  |  0.80    Ca    9.0      2025 06:46    TPro  6.9  /  Alb  3.8  /  TBili  0.4  /  DBili  x   /  AST  22  /  ALT  32  /  AlkPhos  58  07-12     LIVER FUNCTIONS - ( 2025 06:46 )  Alb: 3.8 g/dL / Pro: 6.9 gm/dL / ALK PHOS: 58 U/L / ALT: 32 U/L / AST: 22 U/L / GGT: x           Urinalysis Basic - ( 2025 06:46 )    Color: x / Appearance: x / SG: x / pH: x  Gluc: 115 mg/dL / Ketone: x  / Bili: x / Urobili: x   Blood: x / Protein: x / Nitrite: x   Leuk Esterase: x / RBC: x / WBC x   Sq Epi: x / Non Sq Epi: x / Bacteria: x          Radiology: all available radiological tests reviewed    Advanced directives addressed: full resuscitation

## 2025-07-12 NOTE — BRIEF OPERATIVE NOTE - COMMENTS
Please keep dressings clean, dry and intact  Weightbearing-as-tolerated to the Left foot in the dispensed surgical shoe  Call the office ([phone]) to set an appointment to follow up w/ Dr. Ruth in office as out-patient within one week after discharge

## 2025-07-12 NOTE — BRIEF OPERATIVE NOTE - NSICDXBRIEFPOSTOP_GEN_ALL_CORE_FT
POST-OP DIAGNOSIS:  Acute abscess 12-Jul-2025 16:43:12 Left Foot Abscess and Paronychia Prema Burrell  Acute abscess 12-Jul-2025 16:44:05  Prema Burrell  Abscess of skin or subcutaneous tissue 12-Jul-2025 16:44:24  Prema Burrell  Status post incision and drainage 12-Jul-2025 16:44:48  Prema Burrell

## 2025-07-13 VITALS
DIASTOLIC BLOOD PRESSURE: 65 MMHG | OXYGEN SATURATION: 97 % | RESPIRATION RATE: 18 BRPM | HEART RATE: 55 BPM | SYSTOLIC BLOOD PRESSURE: 113 MMHG | TEMPERATURE: 98 F

## 2025-07-13 LAB
GRAM STN FLD: ABNORMAL
SPECIMEN SOURCE: SIGNIFICANT CHANGE UP
VANCOMYCIN TROUGH SERPL-MCNC: 10.1 UG/ML — SIGNIFICANT CHANGE UP (ref 10–20)

## 2025-07-13 PROCEDURE — 99239 HOSP IP/OBS DSCHRG MGMT >30: CPT

## 2025-07-13 RX ORDER — OXYCODONE HYDROCHLORIDE 30 MG/1
1 TABLET ORAL
Qty: 15 | Refills: 0
Start: 2025-07-13 | End: 2025-07-17

## 2025-07-13 RX ORDER — DOXYCYCLINE HYCLATE 100 MG
1 TABLET ORAL
Qty: 20 | Refills: 0
Start: 2025-07-13 | End: 2025-07-22

## 2025-07-13 RX ORDER — CEFUROXIME SODIUM 1.5 G
1 VIAL (EA) INJECTION
Qty: 20 | Refills: 0
Start: 2025-07-13 | End: 2025-07-22

## 2025-07-13 RX ADMIN — Medication 650 MILLIGRAM(S): at 00:36

## 2025-07-13 RX ADMIN — Medication 650 MILLIGRAM(S): at 00:06

## 2025-07-13 RX ADMIN — Medication 166.67 MILLIGRAM(S): at 10:18

## 2025-07-13 RX ADMIN — CEFTRIAXONE 1000 MILLIGRAM(S): 500 INJECTION, POWDER, FOR SOLUTION INTRAMUSCULAR; INTRAVENOUS at 12:39

## 2025-07-13 NOTE — DISCHARGE NOTE PROVIDER - NSDCMRMEDTOKEN_GEN_ALL_CORE_FT
cefuroxime 500 mg oral tablet: 1 tab(s) orally 2 times a day  doxycycline hyclate 100 mg oral capsule: 1 cap(s) orally 2 times a day  oxyCODONE 5 mg oral tablet: 1 tab(s) orally 3 times a day as needed for  severe pain MDD: 3tb

## 2025-07-13 NOTE — DISCHARGE NOTE PROVIDER - CARE PROVIDER_API CALL
Clarisa Ruth  Surgery Podiatric  [address] [address]  Phone: [phone]  Fax: [phone]  Follow Up Time: 1 week

## 2025-07-13 NOTE — DISCHARGE NOTE NURSING/CASE MANAGEMENT/SOCIAL WORK - FINANCIAL ASSISTANCE
[facility] provides services at a reduced cost to those who are determined to be eligible through [facility]’s financial assistance program. Information regarding [facility]’s financial assistance program can be found by going to [email or web address] or by calling [phone].

## 2025-07-13 NOTE — DISCHARGE NOTE PROVIDER - HOSPITAL COURSE
45 y/o M with no PMHx presents with redness/swelling/pain to left great toe with onset 6 weeks ago. Pt had ingrown toe nail procedure done about 6 weeks back, was placed on abx after the procedure. Pt states there was no improvement in the pain and drainage, his abx were changed to bactrim (2weeks) which did not help him with the infection as well. Pt currently on Augmentin. Pt states he has pain to the area along with drainage. He was seen by Podiatry Dr Ruth in the office this morning and rec'd for bedside procedure, however pt refused for any bedside intervention and is amendable for only OR. Patient otherwise feels well, and denies chest pain, SOB, f/c/n/v/d, abd pain, LE edema, dysuria, HA, dizziness.    7.12: no cp, no sob, , no n/v/d  7.13: doing well, s/p I&D    Vital Signs Last 24 Hrs  T(C): 36.6 (12 Jul 2025 15:35), Max: 36.6 (12 Jul 2025 15:35)  T(F): 97.8 (12 Jul 2025 15:35), Max: 97.8 (12 Jul 2025 15:35)  HR: 48 (12 Jul 2025 15:35) (48 - 59)  BP: 116/64 (12 Jul 2025 15:35) (100/60 - 116/64)  BP(mean): 76 (12 Jul 2025 15:35) (76 - 76)  RR: 17 (12 Jul 2025 15:35) (17 - 18)  SpO2: 100% (12 Jul 2025 15:35) (98% - 100%)    Parameters below as of 12 Jul 2025 15:35  Patient On (Oxygen Delivery Method): room air        PHYSICAL EXAM:    GENERAL: NAD  HEENT:  NC/AT, EOMI, PERRLA, No scleral icterus, Moist mucous membranes  NECK: Supple, No JVD  CNS:  Alert & Oriented X3, Motor Strength 5/5 B/L upper and lower extremities; DTRs 2+ intact   LUNG: Normal Breath sounds, Clear to auscultation bilaterally, No rales, No rhonchi, No wheezing  HEART: RRR; No murmurs, No rubs  ABDOMEN: +BS, ST/ND/NT  GENITOURINARY: Voiding, Bladder not distended  EXTREMITIES:  2+ Peripheral Pulses, No clubbing, No cyanosis, No tibial edema  MUSCULOSKELTAL: L halux edema and erythema , +purulence   SKIN: + rashes  RECTAL: deferred, not indicated  BREAST: deferred    a/p:    1. L halux infection due to ingrown toe nail:   Ceftriaxone/Vanco iv day#2  s/p I&D pod#1  will d/c home on oral Abx: Ceftin/Doxy  Local wound care per podiatry  outpatient f/u  analgesia prn

## 2025-07-13 NOTE — PROGRESS NOTE ADULT - SUBJECTIVE AND OBJECTIVE BOX
Date of service: 07-13-25 @ 15:02    pt seen and examined  afebrile  s/p I & D of L great toe  no o/n events    ROS: no fever or chills; denies dizziness, no HA, no SOB or cough, no abdominal pain, no diarrhea or constipation; no dysuria, no urinary frequency, no legs pain, no rashes    MEDICATIONS  (STANDING):  cefTRIAXone Injectable. 1000 milliGRAM(s) IV Push every 24 hours  vancomycin  IVPB 1250 milliGRAM(s) IV Intermittent every 12 hours      Vital Signs Last 24 Hrs  T(C): 36.7 (13 Jul 2025 07:04), Max: 36.7 (12 Jul 2025 23:43)  T(F): 98.1 (13 Jul 2025 07:04), Max: 98.1 (13 Jul 2025 07:04)  HR: 55 (13 Jul 2025 07:04) (48 - 59)  BP: 113/65 (13 Jul 2025 07:04) (106/71 - 127/79)  BP(mean): 76 (12 Jul 2025 15:35) (76 - 76)  RR: 18 (13 Jul 2025 07:04) (12 - 18)  SpO2: 97% (13 Jul 2025 07:04) (95% - 100%)    Parameters below as of 13 Jul 2025 07:04  Patient On (Oxygen Delivery Method): room air          PE:  Constitutional: NAD  HEENT: NC/AT, EOMI, PERRLA, conjunctivae clear; ears and nose atraumatic; pharynx benign  Neck: supple; thyroid not palpable  Back: no tenderness  Respiratory: respiratory effort normal; clear to auscultation  Cardiovascular: S1S2 regular, no murmurs  Abdomen: soft, not tender, not distended, positive BS; liver and spleen WNL  Genitourinary: no suprapubic tenderness  Lymphatic: no LN palpable  Musculoskeletal: no muscle tenderness, no joint swelling or tenderness  Extremities: no pedal edema L great toe redness tenderness   Neurological/ Psychiatric: AxOx3, Judgement and insight normal;  moving all extremities  Skin: no rashes; no palpable lesions    Labs: all available labs reviewed                                   13.1   3.13  )-----------( 145      ( 12 Jul 2025 06:46 )             39.3     07-12    142  |  108  |  14  ----------------------------<  115[H]  3.8   |  30  |  0.80    Ca    9.0      12 Jul 2025 06:46    TPro  6.9  /  Alb  3.8  /  TBili  0.4  /  DBili  x   /  AST  22  /  ALT  32  /  AlkPhos  58  07-12       Vancomycin Level, Trough: 10.1 ug/mL (07-13 @ 09:54)  Vancomycin Level, Trough: 10.8 ug/mL (07-12 @ 20:58)      LIVER FUNCTIONS - ( 12 Jul 2025 06:46 )  Alb: 3.8 g/dL / Pro: 6.9 gm/dL / ALK PHOS: 58 U/L / ALT: 32 U/L / AST: 22 U/L / GGT: x           Urinalysis Basic - ( 12 Jul 2025 06:46 )    Color: x / Appearance: x / SG: x / pH: x  Gluc: 115 mg/dL / Ketone: x  / Bili: x / Urobili: x   Blood: x / Protein: x / Nitrite: x   Leuk Esterase: x / RBC: x / WBC x   Sq Epi: x / Non Sq Epi: x / Bacteria: x      Culture - Abscess with Gram Stain (07.12.25 @ 16:00)   Gram Stain:   Rare polymorphonuclear leukocytes seen per low power field   Rare Gram positive cocci in pairs seen per oil power field  Specimen Source: Abscess Left Big Toe  Culture - Blood (07.11.25 @ 13:05)   Specimen Source: Blood None  Culture Results:   No growth at 24 hours  Culture - Blood (07.11.25 @ 13:05)   Specimen Source: Blood None  Culture Results:   No growth at 24 hours    Radiology: all available radiological tests reviewed    Advanced directives addressed: full resuscitation
7/13/2025: Pt was seen by the Podiatry team. Pt was resting comfortably bedside. POD s/p Incision and drainage of Left foot abscess (DOS 7/13/25). Pt is afebrile, aleukocytic. Pt  is stable for discharge from podiatry standpoint if ok by other providers.      PMH: No pertinent past medical history    PSH:  History of lumbar laminectomy    Allergies:  No Known Allergies    Labs:                     13.1   3.13  )-----------( 145      ( 12 Jul 2025 06:46 )             39.3   07-12    142  |  108  |  14  ----------------------------<  115[H]  3.8   |  30  |  0.80    Ca    9.0      12 Jul 2025 06:46    TPro  6.9  /  Alb  3.8  /  TBili  0.4  /  DBili  x   /  AST  22  /  ALT  32  /  AlkPhos  58  07-12       Vital Signs Last 24 Hrs  T(C): 36.7 (13 Jul 2025 07:04), Max: 36.7 (12 Jul 2025 23:43)  T(F): 98.1 (13 Jul 2025 07:04), Max: 98.1 (13 Jul 2025 07:04)  HR: 55 (13 Jul 2025 07:04) (48 - 59)  BP: 113/65 (13 Jul 2025 07:04) (106/71 - 127/79)  BP(mean): 76 (12 Jul 2025 15:35) (76 - 76)  RR: 18 (13 Jul 2025 07:04) (12 - 18)  SpO2: 97% (13 Jul 2025 07:04) (95% - 100%)    Parameters below as of 13 Jul 2025 07:04  Patient On (Oxygen Delivery Method): room air        REVIEW OF SYSTEMS:    CONSTITUTIONAL: No weakness, fevers or chills  EYES: No visual changes  RESPIRATORY: No cough, wheezing; No shortness of breath  CARDIOVASCULAR: No chest pain or palpitations  GASTROINTESTINAL: No abdominal or epigastric pain. No nausea, vomiting; No diarrhea or constipation.   GENITOURINARY: No dysuria, frequency or hematuria  NEUROLOGICAL: No numbness or weakness  SKIN: See physical examination.  All other review of systems is negative unless indicated above    Physical Exam:   Constitutional: NAD, alert;  Lower Extremity Focus  Derm:  Skin warm, dry and supple Left foot  Left:  Dressings are clean, dry and intact. No strikethrough drainage.  Vascular: Dorsalis Pedis and Posterior Tibial pulses 2/4 left foot  Neuro: Protective sensation intact to the level of the digits left foot  MSK: Muscle strength 5/5 all major muscle groups bilateral. +TTP to the left hallux      
History of Present Illness:   43 y/o M with no PMHx presents with redness/swelling/pain to left great toe with onset 6 weeks ago. Pt had ingrown toe nail procedure done about 6 weeks back, was placed on abx after the procedure. Pt states there was no improvement in the pain and drainage, his abx were changed to bactrim (2weeks) which did not help him with the infection as well. Pt currently on Augmentin. Pt states he has pain to the area along with drainage. He was seen by Podiatry Dr Ruth in the office this morning and rec'd for bedside procedure, however pt refused for any bedside intervention and is amendable for only OR. Patient otherwise feels well, and denies chest pain, SOB, f/c/n/v/d, abd pain, LE edema, dysuria, HA, dizziness.    7.12: no cp, no sob, , no n/v/d            REVIEW OF SYSTEMS:    CONSTITUTIONAL: No weakness, No fevers or chills  ENT: No ear ache, No sorethroat  NECK: No pain, No stiffness  RESPIRATORY: No cough, No wheezing, No hemoptysis; No dyspnea  CARDIOVASCULAR: No chest pain, No palpitations  GASTROINTESTINAL: No abd pain, No nausea, No vomiting, No hematemesis, No diarrhea or constipation. No melena, No hematochezia.  GENITOURINARY: No dysuria, No  hematuria  NEUROLOGICAL: No diplopia, No paresthesia, No motor dysfunction  MUSCULOSKELETAL: No arthralgia, No myalgia  SKIN: No rashes, or lesions   PSYCH: no anxiety, no suicidal ideation    All other review of systems is negative unless indicated above    Vital Signs Last 24 Hrs  T(C): 36.6 (12 Jul 2025 15:35), Max: 36.6 (12 Jul 2025 15:35)  T(F): 97.8 (12 Jul 2025 15:35), Max: 97.8 (12 Jul 2025 15:35)  HR: 48 (12 Jul 2025 15:35) (48 - 59)  BP: 116/64 (12 Jul 2025 15:35) (100/60 - 116/64)  BP(mean): 76 (12 Jul 2025 15:35) (76 - 76)  RR: 17 (12 Jul 2025 15:35) (17 - 18)  SpO2: 100% (12 Jul 2025 15:35) (98% - 100%)    Parameters below as of 12 Jul 2025 15:35  Patient On (Oxygen Delivery Method): room air        PHYSICAL EXAM:    GENERAL: NAD  HEENT:  NC/AT, EOMI, PERRLA, No scleral icterus, Moist mucous membranes  NECK: Supple, No JVD  CNS:  Alert & Oriented X3, Motor Strength 5/5 B/L upper and lower extremities; DTRs 2+ intact   LUNG: Normal Breath sounds, Clear to auscultation bilaterally, No rales, No rhonchi, No wheezing  HEART: RRR; No murmurs, No rubs  ABDOMEN: +BS, ST/ND/NT  GENITOURINARY: Voiding, Bladder not distended  EXTREMITIES:  2+ Peripheral Pulses, No clubbing, No cyanosis, No tibial edema  MUSCULOSKELTAL: L halux edema and erythema , +purulence   SKIN: + rashes  RECTAL: deferred, not indicated  BREAST: deferred                          13.1   3.13  )-----------( 145      ( 12 Jul 2025 06:46 )             39.3     07-12    142  |  108  |  14  ----------------------------<  115[H]  3.8   |  30  |  0.80    Ca    9.0      12 Jul 2025 06:46    TPro  6.9  /  Alb  3.8  /  TBili  0.4  /  DBili  x   /  AST  22  /  ALT  32  /  AlkPhos  58  07-12    Vancomycin levels:   Cultures:     MEDICATIONS  (STANDING):  cefTRIAXone Injectable. 1000 milliGRAM(s) IV Push every 24 hours  vancomycin  IVPB 1250 milliGRAM(s) IV Intermittent every 12 hours    MEDICATIONS  (PRN):  acetaminophen     Tablet .. 650 milliGRAM(s) Oral once PRN Mild Pain (1 - 3)  aluminum hydroxide/magnesium hydroxide/simethicone Suspension 30 milliLiter(s) Oral every 4 hours PRN Dyspepsia  melatonin 3 milliGRAM(s) Oral at bedtime PRN Insomnia  ondansetron Injectable 4 milliGRAM(s) IV Push every 8 hours PRN Nausea and/or Vomiting  oxyCODONE    IR 5 milliGRAM(s) Oral every 6 hours PRN Moderate Pain (4 - 6)  oxyCODONE    IR 10 milliGRAM(s) Oral every 6 hours PRN Severe Pain (7 - 10)      all labs reviewed  all imaging reviewed    a/p:    1. L halux infection due to ingrown toe nail:   Ceftriaxone/Vanco iv day#1  Podiatry eval for surgical debridement  analgesia prn    Medically stable for the podiatry procedure 
7/12/2025: Pt was seen by the Podiatry team. Pt was resting comfortably bedside. Pt is awaiting OR intervention. NPO since midnight    PMH: No pertinent past medical history    PSH:  History of lumbar laminectomy    Allergies:  No Known Allergies    Labs:                                13.1   3.13  )-----------( 145      ( 12 Jul 2025 06:46 )             39.3   07-12    142  |  108  |  14  ----------------------------<  115[H]  3.8   |  30  |  0.80    Ca    9.0      12 Jul 2025 06:46    TPro  6.9  /  Alb  3.8  /  TBili  0.4  /  DBili  x   /  AST  22  /  ALT  32  /  AlkPhos  58  07-12       Vital Signs Last 24 Hrs  T(C): 36.4 (12 Jul 2025 07:11), Max: 36.5 (11 Jul 2025 15:39)  T(F): 97.5 (12 Jul 2025 07:11), Max: 97.7 (11 Jul 2025 15:39)  HR: 52 (12 Jul 2025 07:11) (52 - 59)  BP: 111/74 (12 Jul 2025 07:11) (100/60 - 117/71)  BP(mean): 81 (11 Jul 2025 15:39) (81 - 81)  RR: 18 (12 Jul 2025 07:11) (17 - 18)  SpO2: 98% (12 Jul 2025 07:11) (98% - 100%)    Parameters below as of 12 Jul 2025 07:11  Patient On (Oxygen Delivery Method): room air          REVIEW OF SYSTEMS:    CONSTITUTIONAL: No weakness, fevers or chills  EYES: No visual changes  RESPIRATORY: No cough, wheezing; No shortness of breath  CARDIOVASCULAR: No chest pain or palpitations  GASTROINTESTINAL: No abdominal or epigastric pain. No nausea, vomiting; No diarrhea or constipation.   GENITOURINARY: No dysuria, frequency or hematuria  NEUROLOGICAL: No numbness or weakness  SKIN: See physical examination.  All other review of systems is negative unless indicated above    Physical Exam:   Constitutional: NAD, alert;  Lower Extremity Focus  Derm:  Skin warm, dry and supple Left foot  Left hallux erythema and edema noted, fluctuance noted to the proximal area of the nail bed, some drainage noted oozing from the lateral nail border without any malodor,   Vascular: Dorsalis Pedis and Posterior Tibial pulses 2/4 left foot  Neuro: Protective sensation intact to the level of the digits left foot  MSK: Muscle strength 5/5 all major muscle groups bilateral. +TTP to the left hallux

## 2025-07-13 NOTE — PROGRESS NOTE ADULT - ASSESSMENT
A: 44yr old male seen for the following:   -Left hallux abscess  -Left foot pain    P:   Chart reviewed and Patient evaluated;  Discussed diagnosis and treatment with patient  X-rays reviewed : on wet read showing no acute radiographic findings  Pt with small area of fluctuance to the distal hallux, some oozing noted from the nail border with sever tenderness to palpation  Discussed tx options which include bedside total nail avulsion and I&D; pt is not amendable for any bedside procedure  Discussed with ED for potential conscious sedation for the procedure, but not available currently  Plan for OR intervention for Incision and Drainage of the Left foot today, Saturday, 7/12/25.  NPO since Midnight  Clearances are appreciated   Applied betadine DSD  WBAT to LLE using surgical shoe   Abx per ID  All additional care per Med appreciated  Patient demonstrated verbal understanding of all interventions and tolerated interventions well without any complications.   Podiatry will follow while in house    Case D/W attending Dr. Ruth
43 y/o M with no PMHx presents with redness/swelling/pain to left great toe with onset 6 weeks ago. Pt had ingrown toe nail procedure done about 6 weeks back, was placed on abx after the procedure. Pt states there was no improvement in the pain and drainage, his abx were changed to bactrim (2weeks) which did not help him with the infection as well. Pt currently on Augmentin. Pt states he has pain to the area along with drainage. He was seen by Podiatry Dr Ruth in the office this morning and rec'd for bedside procedure, however pt refused for any bedside intervention and is amendable for only OR. Patient otherwise feels well, and denies chest pain, SOB, f/c/n/v/d, abd pain, LE edema, dysuria, HA, dizziness.In ED, VSS and pt afebrile. Labs notable for WBC 2.9, ESR/CRP negative, BMP Unremarkable. Left foot Xray read pending. Seen by Podiatry and plan for OR today for left hallux I&D. Received IV CTX + Vanco and 1L IVF in ED, admitted to med/surg for further management.    L hallux cellulitis/abscess  - imaging reviewed  - podiatry eval noted s/p I & D f/u cx   - on vancomycin [id number] check trough prior to 4th dose #2  - on rocephin 1gm daily #2   - continue with abx coverage  - f/u cultures  - monitor temps  - tolerating abx well so far; no side effects noted  - reason for abx use and side effects reviewed with patient  - supportive care  - fu cbc    Concern for infection with multi-resistant organisms was raised. Prior cultures reviewed. An epidemiologic assessment was performed. There is a significant risk for resistant microorganisms to spread to family members, and/or healthcare staff. The patient will be placed on isolation according to infection control policy. Will reconsider isolation measures based on new culture results and other clinical data as appropriate. Appropriate cultures collected and an appropriate broad spectrum antibiotic therapy will be considered.    Clinical team may change from intravenous to oral antibiotics when the following criteria are met:   1. Patient is clinically improving/stable       a)	Improved signs and symptoms of infection from initial presentation       b)	Afebrile for 24 hours       c)	Leukocytosis trending towards normal range   2. Patient is tolerating oral intake   3. Initial/repeat blood cultures are negative     dc on po ceftin/doxy x 10 days adjust per cx results   
A: 44-year-old male was seen for the following:   -Left hallux abscess >>  s/p Incision and drainage of Left foot abscess (DOS 7/13/25)  -Left foot pain    P:   Chart reviewed and Patient evaluated;  Afebrile, Aleukocytic  POD 1 s/p Incision and drainage of Left foot abscess (DOS 7/13/25)   OR findings low concern for residual infection  OR cultures are pending: will follow up results  WBAT to LLE using surgical shoe  Abx per ID  Pt  is stable for discharge from podiatry standpoint if ok by other specialties.  No acute podiatric surgical intervention warranted at this time. Pt to follow Dr. Ruth as outpatient within one week after discharge  All additional care per Med appreciated  Patient demonstrated verbal understanding of all interventions and tolerated interventions well without any complications.   Podiatry will follow while in house    Case D/W attending Dr. Ruth

## 2025-07-13 NOTE — DISCHARGE NOTE NURSING/CASE MANAGEMENT/SOCIAL WORK - PATIENT PORTAL LINK FT
You can access the FollowMyHealth Patient Portal offered by [facility] by registering at the following website: [email or web address]. By joining [facility]’s FollowMyHealth portal, you will also be able to view your health information using other applications (apps) compatible with our system.

## 2025-07-16 LAB
CULTURE RESULTS: SIGNIFICANT CHANGE UP
CULTURE RESULTS: SIGNIFICANT CHANGE UP
SPECIMEN SOURCE: SIGNIFICANT CHANGE UP
SPECIMEN SOURCE: SIGNIFICANT CHANGE UP

## 2025-07-17 LAB
CULTURE RESULTS: ABNORMAL
SPECIMEN SOURCE: SIGNIFICANT CHANGE UP

## 2025-07-20 DIAGNOSIS — L02.612 CUTANEOUS ABSCESS OF LEFT FOOT: ICD-10-CM

## 2025-07-20 DIAGNOSIS — L03.032 CELLULITIS OF LEFT TOE: ICD-10-CM

## 2025-07-20 DIAGNOSIS — L60.0 INGROWING NAIL: ICD-10-CM

## 2025-08-13 LAB
CULTURE RESULTS: SIGNIFICANT CHANGE UP
SPECIMEN SOURCE: SIGNIFICANT CHANGE UP